# Patient Record
Sex: MALE | Race: BLACK OR AFRICAN AMERICAN
[De-identification: names, ages, dates, MRNs, and addresses within clinical notes are randomized per-mention and may not be internally consistent; named-entity substitution may affect disease eponyms.]

---

## 2019-11-12 ENCOUNTER — HOSPITAL ENCOUNTER (INPATIENT)
Dept: HOSPITAL 15 - ER | Age: 64
LOS: 8 days | Discharge: HOME HEALTH | DRG: 133 | End: 2019-11-20
Attending: INTERNAL MEDICINE | Admitting: NURSE PRACTITIONER
Payer: MEDICAID

## 2019-11-12 VITALS — WEIGHT: 211.64 LBS | HEIGHT: 68 IN | BODY MASS INDEX: 32.08 KG/M2

## 2019-11-12 DIAGNOSIS — E66.9: ICD-10-CM

## 2019-11-12 DIAGNOSIS — E87.5: ICD-10-CM

## 2019-11-12 DIAGNOSIS — I50.43: ICD-10-CM

## 2019-11-12 DIAGNOSIS — I13.0: ICD-10-CM

## 2019-11-12 DIAGNOSIS — D63.8: ICD-10-CM

## 2019-11-12 DIAGNOSIS — Z99.81: ICD-10-CM

## 2019-11-12 DIAGNOSIS — K72.90: ICD-10-CM

## 2019-11-12 DIAGNOSIS — Z71.6: ICD-10-CM

## 2019-11-12 DIAGNOSIS — K21.9: ICD-10-CM

## 2019-11-12 DIAGNOSIS — E11.22: ICD-10-CM

## 2019-11-12 DIAGNOSIS — F12.90: ICD-10-CM

## 2019-11-12 DIAGNOSIS — Z79.01: ICD-10-CM

## 2019-11-12 DIAGNOSIS — J44.9: ICD-10-CM

## 2019-11-12 DIAGNOSIS — N18.4: ICD-10-CM

## 2019-11-12 DIAGNOSIS — N40.0: ICD-10-CM

## 2019-11-12 DIAGNOSIS — I08.1: ICD-10-CM

## 2019-11-12 DIAGNOSIS — J96.00: Primary | ICD-10-CM

## 2019-11-12 DIAGNOSIS — E78.5: ICD-10-CM

## 2019-11-12 DIAGNOSIS — E11.65: ICD-10-CM

## 2019-11-12 DIAGNOSIS — Z86.718: ICD-10-CM

## 2019-11-12 DIAGNOSIS — D68.9: ICD-10-CM

## 2019-11-12 DIAGNOSIS — I42.9: ICD-10-CM

## 2019-11-12 DIAGNOSIS — Z99.2: ICD-10-CM

## 2019-11-12 DIAGNOSIS — Z83.3: ICD-10-CM

## 2019-11-12 DIAGNOSIS — F17.210: ICD-10-CM

## 2019-11-12 DIAGNOSIS — Z79.899: ICD-10-CM

## 2019-11-12 DIAGNOSIS — Z82.49: ICD-10-CM

## 2019-11-12 DIAGNOSIS — N17.9: ICD-10-CM

## 2019-11-12 PROCEDURE — 84100 ASSAY OF PHOSPHORUS: CPT

## 2019-11-12 PROCEDURE — 93005 ELECTROCARDIOGRAM TRACING: CPT

## 2019-11-12 PROCEDURE — 84484 ASSAY OF TROPONIN QUANT: CPT

## 2019-11-12 PROCEDURE — 83036 HEMOGLOBIN GLYCOSYLATED A1C: CPT

## 2019-11-12 PROCEDURE — 93306 TTE W/DOPPLER COMPLETE: CPT

## 2019-11-12 PROCEDURE — 87340 HEPATITIS B SURFACE AG IA: CPT

## 2019-11-12 PROCEDURE — 36600 WITHDRAWAL OF ARTERIAL BLOOD: CPT

## 2019-11-12 PROCEDURE — 85610 PROTHROMBIN TIME: CPT

## 2019-11-12 PROCEDURE — 83880 ASSAY OF NATRIURETIC PEPTIDE: CPT

## 2019-11-12 PROCEDURE — 86803 HEPATITIS C AB TEST: CPT

## 2019-11-12 PROCEDURE — 81001 URINALYSIS AUTO W/SCOPE: CPT

## 2019-11-12 PROCEDURE — 83735 ASSAY OF MAGNESIUM: CPT

## 2019-11-12 PROCEDURE — 82805 BLOOD GASES W/O2 SATURATION: CPT

## 2019-11-12 PROCEDURE — 82270 OCCULT BLOOD FECES: CPT

## 2019-11-12 PROCEDURE — 96361 HYDRATE IV INFUSION ADD-ON: CPT

## 2019-11-12 PROCEDURE — 96365 THER/PROPH/DIAG IV INF INIT: CPT

## 2019-11-12 PROCEDURE — 82728 ASSAY OF FERRITIN: CPT

## 2019-11-12 PROCEDURE — 86704 HEP B CORE ANTIBODY TOTAL: CPT

## 2019-11-12 PROCEDURE — 80048 BASIC METABOLIC PNL TOTAL CA: CPT

## 2019-11-12 PROCEDURE — 71045 X-RAY EXAM CHEST 1 VIEW: CPT

## 2019-11-12 PROCEDURE — 84132 ASSAY OF SERUM POTASSIUM: CPT

## 2019-11-12 PROCEDURE — 86706 HEP B SURFACE ANTIBODY: CPT

## 2019-11-12 PROCEDURE — 78582 LUNG VENTILAT&PERFUS IMAGING: CPT

## 2019-11-12 PROCEDURE — 80076 HEPATIC FUNCTION PANEL: CPT

## 2019-11-12 PROCEDURE — 85730 THROMBOPLASTIN TIME PARTIAL: CPT

## 2019-11-12 PROCEDURE — 80053 COMPREHEN METABOLIC PANEL: CPT

## 2019-11-12 PROCEDURE — 83550 IRON BINDING TEST: CPT

## 2019-11-12 PROCEDURE — 87081 CULTURE SCREEN ONLY: CPT

## 2019-11-12 PROCEDURE — 86708 HEPATITIS A ANTIBODY: CPT

## 2019-11-12 PROCEDURE — 83540 ASSAY OF IRON: CPT

## 2019-11-12 PROCEDURE — 80307 DRUG TEST PRSMV CHEM ANLYZR: CPT

## 2019-11-12 PROCEDURE — 80061 LIPID PANEL: CPT

## 2019-11-12 PROCEDURE — 36415 COLL VENOUS BLD VENIPUNCTURE: CPT

## 2019-11-12 PROCEDURE — 96375 TX/PRO/DX INJ NEW DRUG ADDON: CPT

## 2019-11-12 PROCEDURE — 90935 HEMODIALYSIS ONE EVALUATION: CPT

## 2019-11-12 PROCEDURE — 94640 AIRWAY INHALATION TREATMENT: CPT

## 2019-11-12 PROCEDURE — 85025 COMPLETE CBC W/AUTO DIFF WBC: CPT

## 2019-11-12 PROCEDURE — 80162 ASSAY OF DIGOXIN TOTAL: CPT

## 2019-11-12 PROCEDURE — 93970 EXTREMITY STUDY: CPT

## 2019-11-12 PROCEDURE — 82962 GLUCOSE BLOOD TEST: CPT

## 2019-11-12 PROCEDURE — 85379 FIBRIN DEGRADATION QUANT: CPT

## 2019-11-13 VITALS — SYSTOLIC BLOOD PRESSURE: 107 MMHG | DIASTOLIC BLOOD PRESSURE: 69 MMHG

## 2019-11-13 VITALS — SYSTOLIC BLOOD PRESSURE: 114 MMHG | DIASTOLIC BLOOD PRESSURE: 67 MMHG

## 2019-11-13 VITALS — SYSTOLIC BLOOD PRESSURE: 115 MMHG | DIASTOLIC BLOOD PRESSURE: 81 MMHG

## 2019-11-13 VITALS — DIASTOLIC BLOOD PRESSURE: 77 MMHG | SYSTOLIC BLOOD PRESSURE: 143 MMHG

## 2019-11-13 VITALS — DIASTOLIC BLOOD PRESSURE: 75 MMHG | SYSTOLIC BLOOD PRESSURE: 111 MMHG

## 2019-11-13 VITALS — SYSTOLIC BLOOD PRESSURE: 86 MMHG | DIASTOLIC BLOOD PRESSURE: 60 MMHG

## 2019-11-13 LAB
ALBUMIN SERPL-MCNC: 3 G/DL (ref 3.4–5)
ALCOHOL, URINE: < 3 MG/DL (ref 0–5)
ALP SERPL-CCNC: 261 U/L (ref 45–117)
ALT SERPL-CCNC: 136 U/L (ref 16–61)
AMPHETAMINES UR QL SCN: NEGATIVE
ANION GAP SERPL CALCULATED.3IONS-SCNC: 6 MMOL/L (ref 5–15)
ANION GAP SERPL CALCULATED.3IONS-SCNC: 8 MMOL/L (ref 5–15)
APTT PPP: 28.7 SEC (ref 23.64–32.05)
BARBITURATES UR QL SCN: NEGATIVE
BENZODIAZ UR QL SCN: NEGATIVE
BILIRUB SERPL-MCNC: 0.3 MG/DL (ref 0.2–1)
BUN SERPL-MCNC: 102 MG/DL (ref 7–18)
BUN SERPL-MCNC: 89 MG/DL (ref 7–18)
BUN/CREAT SERPL: 25.3
BUN/CREAT SERPL: 28.9
BZE UR QL SCN: NEGATIVE
CALCIUM SERPL-MCNC: 8.2 MG/DL (ref 8.5–10.1)
CALCIUM SERPL-MCNC: 8.4 MG/DL (ref 8.5–10.1)
CANNABINOIDS UR QL SCN: NEGATIVE
CHLORIDE SERPL-SCNC: 106 MMOL/L (ref 98–107)
CHLORIDE SERPL-SCNC: 108 MMOL/L (ref 98–107)
CO2 SERPL-SCNC: 22 MMOL/L (ref 21–32)
CO2 SERPL-SCNC: 24 MMOL/L (ref 21–32)
GLUCOSE SERPL-MCNC: 148 MG/DL (ref 74–106)
GLUCOSE SERPL-MCNC: 86 MG/DL (ref 74–106)
HCT VFR BLD AUTO: 36.6 % (ref 41–53)
HGB BLD-MCNC: 11.6 G/DL (ref 13.5–17.5)
INR PPP: 1.34 (ref 0.9–1.15)
MCH RBC QN AUTO: 28.8 PG (ref 28–32)
MCV RBC AUTO: 91.2 FL (ref 80–100)
NRBC BLD QL AUTO: 0.1 %
OPIATES UR QL SCN: NEGATIVE
PCP UR QL SCN: NEGATIVE
POTASSIUM SERPL-SCNC: 5.2 MMOL/L (ref 3.5–5.1)
POTASSIUM SERPL-SCNC: 5.8 MMOL/L (ref 3.5–5.1)
PROT SERPL-MCNC: 7 G/DL (ref 6.4–8.2)
SODIUM SERPL-SCNC: 136 MMOL/L (ref 136–145)
SODIUM SERPL-SCNC: 138 MMOL/L (ref 136–145)

## 2019-11-13 RX ADMIN — PANTOPRAZOLE SODIUM SCH MG: 40 TABLET, DELAYED RELEASE ORAL at 09:18

## 2019-11-13 RX ADMIN — Medication SCH STRIP: at 06:19

## 2019-11-13 RX ADMIN — APIXABAN SCH MG: 5 TABLET, FILM COATED ORAL at 20:46

## 2019-11-13 RX ADMIN — CARVEDILOL SCH MG: 3.12 TABLET, FILM COATED ORAL at 09:21

## 2019-11-13 RX ADMIN — SODIUM ZIRCONIUM CYCLOSILICATE SCH GM: 5 POWDER, FOR SUSPENSION ORAL at 23:16

## 2019-11-13 RX ADMIN — HUMAN INSULIN SCH UNITS: 100 INJECTION, SOLUTION SUBCUTANEOUS at 23:37

## 2019-11-13 RX ADMIN — SODIUM ZIRCONIUM CYCLOSILICATE SCH GM: 5 POWDER, FOR SUSPENSION ORAL at 12:58

## 2019-11-13 RX ADMIN — Medication SCH STRIP: at 12:00

## 2019-11-13 RX ADMIN — Medication SCH STRIP: at 23:37

## 2019-11-13 RX ADMIN — HUMAN INSULIN SCH UNITS: 100 INJECTION, SOLUTION SUBCUTANEOUS at 06:19

## 2019-11-13 RX ADMIN — ATORVASTATIN CALCIUM SCH MG: 20 TABLET, FILM COATED ORAL at 20:47

## 2019-11-13 RX ADMIN — DEXTROSE SCH MLS/HR: 50 INJECTION, SOLUTION INTRAVENOUS at 20:12

## 2019-11-13 RX ADMIN — Medication SCH STRIP: at 17:13

## 2019-11-13 RX ADMIN — CARVEDILOL SCH MG: 3.12 TABLET, FILM COATED ORAL at 20:46

## 2019-11-13 RX ADMIN — HUMAN INSULIN SCH UNITS: 100 INJECTION, SOLUTION SUBCUTANEOUS at 12:00

## 2019-11-13 RX ADMIN — APIXABAN SCH MG: 5 TABLET, FILM COATED ORAL at 09:18

## 2019-11-13 RX ADMIN — HUMAN INSULIN SCH UNITS: 100 INJECTION, SOLUTION SUBCUTANEOUS at 17:13

## 2019-11-13 NOTE — NUR
Dr. Garcia / nephro at bed side to see pt, doctor informed of the potassium level of 6.1, 
orders received, doctor discussed plan of care with pt.

## 2019-11-13 NOTE — NUR
Telemetry admit from COREEN BELCHER admitted to Telemetry unit. Patient oriented to CARLY MOORE, primary RN, 
unit, room 284, bed B, and unit policies regarding patient care and visiting hours. Patient 
now on continuous telemetry monitoring, tele box #66 and telemetry reading on arrival to 
unit is SR. Patient placed on bedside oxygen, weighed by bedscale and encouraged to call if 
they need something. Call light explained and placed within reach. Adan catheter is in 
place. All questions and concerns addressed, patient verbalized understanding.

## 2019-11-13 NOTE — NUR
IV insertion

IV access obtained, via clean sterile technique by inserting 22 gauge catheter at LT UPPER 
CHEST after 2 attempts. IV secured properly. No trauma to site. Patient tolerated well.

## 2019-11-13 NOTE — NUR
Opening Shift Note

Assumed care of patient, awake and alert.  No S/S of distress/SOB or pain. Saline bolus 
finished. Patient started on drip medication. Instructed on POC and to call for assist PRN, 
will continue to monitor for changes Q1hr and PRN.

## 2019-11-13 NOTE — NUR
Respiratory note:

PT ASSESSED FOR PRN MED NEB TX. PT IS CURRENTLY ON 2 L/M NC: HR 84, RR 18, SPO2 100%. PT 
SHOWS NO S/S OF SOB OR RESPIRATORY DISTRESS. MED NEB TX NOT INDICATED AT THIS TIME. PT AWARE 
TO CALL RESPIRATORY IF SOB OCCURS. WILL CONTINUE TO MONITOR.

## 2019-11-13 NOTE — NUR
HOSPITALIST PAGED



BM OF PATIENT INSPECTED. BLOOD FOUND IN STOOL. PATIENT STATES THIS IS NEW. WILL INFORM 
HOSPITALIST. WILL AWAIT CALL BACK OR ORDERS.

## 2019-11-13 NOTE — NUR
SALINE BOLUS RUNNING AT THIS TIME, WILL ENDORSE FOR NIGHT SHIFT TO START THE LASIX DRIP 
AFTER THE BOLUS AS PER DR. GARDINER'S ORDERS.

## 2019-11-13 NOTE — NUR
Opening Shift Note



Assumed care of patient, awake and alert.  No S/S of distress/SOB or pain. Bed is in lowest 
position with 2x side rails up for safety. Call light is within reach. Instructed on POC and 
to call for assist PRN, will continue to monitor for changes Q1hr and PRN.

## 2019-11-14 VITALS — SYSTOLIC BLOOD PRESSURE: 141 MMHG | DIASTOLIC BLOOD PRESSURE: 75 MMHG

## 2019-11-14 VITALS — DIASTOLIC BLOOD PRESSURE: 76 MMHG | SYSTOLIC BLOOD PRESSURE: 121 MMHG

## 2019-11-14 VITALS — DIASTOLIC BLOOD PRESSURE: 67 MMHG | SYSTOLIC BLOOD PRESSURE: 107 MMHG

## 2019-11-14 VITALS — DIASTOLIC BLOOD PRESSURE: 72 MMHG | SYSTOLIC BLOOD PRESSURE: 106 MMHG

## 2019-11-14 VITALS — DIASTOLIC BLOOD PRESSURE: 73 MMHG | SYSTOLIC BLOOD PRESSURE: 122 MMHG

## 2019-11-14 LAB
ALBUMIN SERPL-MCNC: 3 G/DL (ref 3.4–5)
ALP SERPL-CCNC: 244 U/L (ref 45–117)
ALT SERPL-CCNC: 147 U/L (ref 16–61)
ANION GAP SERPL CALCULATED.3IONS-SCNC: 10 MMOL/L (ref 5–15)
BILIRUB SERPL-MCNC: 0.4 MG/DL (ref 0.2–1)
BUN SERPL-MCNC: 105 MG/DL (ref 7–18)
BUN/CREAT SERPL: 32.9
CALCIUM SERPL-MCNC: 8.4 MG/DL (ref 8.5–10.1)
CHLORIDE SERPL-SCNC: 106 MMOL/L (ref 98–107)
CHOLEST SERPL-MCNC: 125 MG/DL (ref ?–200)
CO2 SERPL-SCNC: 20 MMOL/L (ref 21–32)
GLUCOSE SERPL-MCNC: 116 MG/DL (ref 74–106)
HCT VFR BLD AUTO: 36.8 % (ref 41–53)
HDLC SERPL-MCNC: 55 MG/DL (ref 40–59)
HGB BLD-MCNC: 11.5 G/DL (ref 13.5–17.5)
MAGNESIUM SERPL-MCNC: 2.8 MG/DL (ref 1.6–2.6)
MCH RBC QN AUTO: 29.4 PG (ref 28–32)
MCV RBC AUTO: 94 FL (ref 80–100)
NRBC BLD QL AUTO: 0.2 %
POTASSIUM SERPL-SCNC: 4.6 MMOL/L (ref 3.5–5.1)
PROT SERPL-MCNC: 6.7 G/DL (ref 6.4–8.2)
SODIUM SERPL-SCNC: 136 MMOL/L (ref 136–145)
TRIGL SERPL-MCNC: 28 MG/DL (ref ?–150)

## 2019-11-14 RX ADMIN — CARVEDILOL SCH MG: 3.12 TABLET, FILM COATED ORAL at 09:33

## 2019-11-14 RX ADMIN — APIXABAN SCH MG: 5 TABLET, FILM COATED ORAL at 09:33

## 2019-11-14 RX ADMIN — SODIUM ZIRCONIUM CYCLOSILICATE SCH GM: 5 POWDER, FOR SUSPENSION ORAL at 15:33

## 2019-11-14 RX ADMIN — Medication SCH STRIP: at 17:51

## 2019-11-14 RX ADMIN — CARVEDILOL SCH MG: 3.12 TABLET, FILM COATED ORAL at 22:12

## 2019-11-14 RX ADMIN — HUMAN INSULIN SCH UNITS: 100 INJECTION, SOLUTION SUBCUTANEOUS at 11:28

## 2019-11-14 RX ADMIN — Medication SCH STRIP: at 11:27

## 2019-11-14 RX ADMIN — ATORVASTATIN CALCIUM SCH MG: 20 TABLET, FILM COATED ORAL at 22:11

## 2019-11-14 RX ADMIN — SODIUM ZIRCONIUM CYCLOSILICATE SCH GM: 5 POWDER, FOR SUSPENSION ORAL at 22:12

## 2019-11-14 RX ADMIN — IPRATROPIUM BROMIDE PRN MG: 0.5 SOLUTION RESPIRATORY (INHALATION) at 20:07

## 2019-11-14 RX ADMIN — ACETAMINOPHEN PRN MG: 325 TABLET ORAL at 09:39

## 2019-11-14 RX ADMIN — HUMAN INSULIN SCH UNITS: 100 INJECTION, SOLUTION SUBCUTANEOUS at 05:34

## 2019-11-14 RX ADMIN — APIXABAN SCH MG: 5 TABLET, FILM COATED ORAL at 22:11

## 2019-11-14 RX ADMIN — DEXTROSE SCH MLS/HR: 50 INJECTION, SOLUTION INTRAVENOUS at 14:01

## 2019-11-14 RX ADMIN — Medication SCH STRIP: at 05:34

## 2019-11-14 RX ADMIN — ACETAMINOPHEN PRN MG: 325 TABLET ORAL at 23:05

## 2019-11-14 RX ADMIN — PANTOPRAZOLE SODIUM SCH MG: 40 TABLET, DELAYED RELEASE ORAL at 09:33

## 2019-11-14 RX ADMIN — HUMAN INSULIN SCH UNITS: 100 INJECTION, SOLUTION SUBCUTANEOUS at 17:51

## 2019-11-14 RX ADMIN — SODIUM ZIRCONIUM CYCLOSILICATE SCH GM: 5 POWDER, FOR SUSPENSION ORAL at 05:34

## 2019-11-14 NOTE — NUR
D/C Planning 



Per SS Consult for Chair Time Dialysis. Contact [facility] Ph:( [phone]) Fax:( [phone]) faxed medical records. Will be following up tomorrow.

## 2019-11-14 NOTE — NUR
Opening Shift Note

Assumed care of patient, awake and alert x4. Patient denies pain at this time. Patient is on 
3L NC, no signs/symptoms of distress noted at this time. Instructed on plan of care and to 
call for assistance as needed, patient verbalized understanding. Bed is locked in lowest 
position, side rails x 2 are up, call light is within reach, and bed alarm is on.

## 2019-11-14 NOTE — NUR
Opening Shift Note

RECEIVED REPORT FROM NOC RN. Assumed care of patient, awake and alert. PATIENT ON OXYGEN AT 
2 LPM VIA NASAL CANNULA WITH no S/S of distress/SOB or pain. BED IN LOWEST, LOCKED POSITION 
WITH SIDERAILS UP x2 AND CALL LIGHT WITHIN REACH. Instructed on POC and to call for assist 
PRN, will continue to monitor for changes Q1hr and PRN.

## 2019-11-14 NOTE — NUR
RT NOTE: PRN BREATHING TX. NOT INDICATED AT THIS TIME. NO S/S OF SOB. PT. HR 84, RR 14, POX 
96% R/A. PT. AWARE TO NOTIFY RN IF BREATHING TX. IS NEEDED.

## 2019-11-15 VITALS — DIASTOLIC BLOOD PRESSURE: 84 MMHG | SYSTOLIC BLOOD PRESSURE: 139 MMHG

## 2019-11-15 VITALS — DIASTOLIC BLOOD PRESSURE: 80 MMHG | SYSTOLIC BLOOD PRESSURE: 112 MMHG

## 2019-11-15 VITALS — DIASTOLIC BLOOD PRESSURE: 80 MMHG | SYSTOLIC BLOOD PRESSURE: 131 MMHG

## 2019-11-15 VITALS — SYSTOLIC BLOOD PRESSURE: 127 MMHG | DIASTOLIC BLOOD PRESSURE: 60 MMHG

## 2019-11-15 VITALS — DIASTOLIC BLOOD PRESSURE: 84 MMHG | SYSTOLIC BLOOD PRESSURE: 137 MMHG

## 2019-11-15 VITALS — DIASTOLIC BLOOD PRESSURE: 85 MMHG | SYSTOLIC BLOOD PRESSURE: 140 MMHG

## 2019-11-15 LAB
ALBUMIN SERPL-MCNC: 3.1 G/DL (ref 3.4–5)
ALP SERPL-CCNC: 252 U/L (ref 45–117)
ALT SERPL-CCNC: 134 U/L (ref 16–61)
ANION GAP SERPL CALCULATED.3IONS-SCNC: 8 MMOL/L (ref 5–15)
BILIRUB SERPL-MCNC: 0.4 MG/DL (ref 0.2–1)
BUN SERPL-MCNC: 90 MG/DL (ref 7–18)
BUN/CREAT SERPL: 30.6
CALCIUM SERPL-MCNC: 8.5 MG/DL (ref 8.5–10.1)
CHLORIDE SERPL-SCNC: 101 MMOL/L (ref 98–107)
CO2 SERPL-SCNC: 26 MMOL/L (ref 21–32)
GLUCOSE SERPL-MCNC: 128 MG/DL (ref 74–106)
POTASSIUM SERPL-SCNC: 4.3 MMOL/L (ref 3.5–5.1)
PROT SERPL-MCNC: 7.2 G/DL (ref 6.4–8.2)
SODIUM SERPL-SCNC: 135 MMOL/L (ref 136–145)

## 2019-11-15 PROCEDURE — 5A1D70Z PERFORMANCE OF URINARY FILTRATION, INTERMITTENT, LESS THAN 6 HOURS PER DAY: ICD-10-PCS | Performed by: INTERNAL MEDICINE

## 2019-11-15 RX ADMIN — HUMAN INSULIN SCH UNITS: 100 INJECTION, SOLUTION SUBCUTANEOUS at 06:00

## 2019-11-15 RX ADMIN — Medication SCH STRIP: at 11:51

## 2019-11-15 RX ADMIN — DEXTROSE SCH MLS/HR: 50 INJECTION, SOLUTION INTRAVENOUS at 18:53

## 2019-11-15 RX ADMIN — APIXABAN SCH MG: 5 TABLET, FILM COATED ORAL at 22:56

## 2019-11-15 RX ADMIN — HUMAN INSULIN SCH UNITS: 100 INJECTION, SOLUTION SUBCUTANEOUS at 18:54

## 2019-11-15 RX ADMIN — Medication SCH STRIP: at 01:31

## 2019-11-15 RX ADMIN — PANTOPRAZOLE SODIUM SCH MG: 40 TABLET, DELAYED RELEASE ORAL at 14:21

## 2019-11-15 RX ADMIN — TEMAZEPAM PRN MG: 15 CAPSULE ORAL at 01:41

## 2019-11-15 RX ADMIN — CARVEDILOL SCH MG: 3.12 TABLET, FILM COATED ORAL at 22:56

## 2019-11-15 RX ADMIN — APIXABAN SCH MG: 5 TABLET, FILM COATED ORAL at 14:20

## 2019-11-15 RX ADMIN — Medication SCH STRIP: at 18:54

## 2019-11-15 RX ADMIN — HUMAN INSULIN SCH UNITS: 100 INJECTION, SOLUTION SUBCUTANEOUS at 01:31

## 2019-11-15 RX ADMIN — SODIUM ZIRCONIUM CYCLOSILICATE SCH GM: 5 POWDER, FOR SUSPENSION ORAL at 05:59

## 2019-11-15 RX ADMIN — HUMAN INSULIN SCH UNITS: 100 INJECTION, SOLUTION SUBCUTANEOUS at 11:50

## 2019-11-15 RX ADMIN — GABAPENTIN SCH MG: 300 CAPSULE ORAL at 22:56

## 2019-11-15 RX ADMIN — CARVEDILOL SCH MG: 3.12 TABLET, FILM COATED ORAL at 14:21

## 2019-11-15 RX ADMIN — ATORVASTATIN CALCIUM SCH MG: 20 TABLET, FILM COATED ORAL at 22:56

## 2019-11-15 RX ADMIN — Medication SCH STRIP: at 06:00

## 2019-11-15 NOTE — NUR
Patient disconnected IV line from IV site and found on room air. Patient stated that he did 
not pull out the IV and he is not sure how it came apart.

-------------------------------------------------------------------------------

Addendum: 11/15/19 at 2342 by HELEN JOHNSON RN RN

-------------------------------------------------------------------------------

Full linen and gown changed

## 2019-11-15 NOTE — NUR
Opening Shift Note

Assumed care of patient. Patient awake and alert. No S/S of distress/SOB or pain. Instructed 
on POC and to call for assist PRN, will continue to monitor for changes. Bed locked in 
lowest position and bed rails up x2. Call light within reach.

## 2019-11-15 NOTE — NUR
MD Discharge Appt

Doctor Sanjeev at bedside. Ordered dialysis for today and tomorrow. Patient needs to have a 
follow up doctors appointment with Dr. Pace 1 week after discharge.

## 2019-11-15 NOTE — NUR
CLOSING SHIFT NOTE

Patient is sitting on the side of the bed with even and unlabored respirations noted. 
Patient is on 3L NC, no signs/symptoms of distress noted at this  time. Bed is locked in 
lowest position, side rails x 3 are up, call light is within reach, and bed alarm is on. 
Endorsed patient care to Leona TAYLOR.

## 2019-11-15 NOTE — NUR
D/C Planning 



Followed up call to [facility] dialysis spoke to Cielo. Per Cielo from [facility] Pt 
chair time will be Tuesday, Thursday, Saturdays at 14:45 and needs to arrived 15 minutes 
early to [id number] [place] [address]. 

-------------------------------------------------------------------------------

Addendum: 11/15/19 at 1448 by THU ALFARO

-------------------------------------------------------------------------------

Amended: Links added.

## 2019-11-15 NOTE — NUR
Opening Shift Note

Received report on the patient. Awake lying in bed. Patient shows no signs of distress at 
this time. Discussed plan of care with the patient and family. Bed is in the lowest 
position, side rails up x2, and call light is within reach.

## 2019-11-15 NOTE — NUR
Critical Lab

notified by Sofia of a critical lab value. BUN is 90. Trending down. MD already aware. 
Patient receiving dialysis today.

## 2019-11-16 VITALS — DIASTOLIC BLOOD PRESSURE: 71 MMHG | SYSTOLIC BLOOD PRESSURE: 128 MMHG

## 2019-11-16 VITALS — SYSTOLIC BLOOD PRESSURE: 113 MMHG | DIASTOLIC BLOOD PRESSURE: 78 MMHG

## 2019-11-16 LAB
ALBUMIN SERPL-MCNC: 3 G/DL (ref 3.4–5)
ALP SERPL-CCNC: 209 U/L (ref 45–117)
ALT SERPL-CCNC: 98 U/L (ref 16–61)
ANION GAP SERPL CALCULATED.3IONS-SCNC: 6 MMOL/L (ref 5–15)
BILIRUB SERPL-MCNC: 0.6 MG/DL (ref 0.2–1)
BUN SERPL-MCNC: 67 MG/DL (ref 7–18)
BUN/CREAT SERPL: 29.6
CALCIUM SERPL-MCNC: 8.1 MG/DL (ref 8.5–10.1)
CHLORIDE SERPL-SCNC: 103 MMOL/L (ref 98–107)
CO2 SERPL-SCNC: 27 MMOL/L (ref 21–32)
GLUCOSE SERPL-MCNC: 93 MG/DL (ref 74–106)
POTASSIUM SERPL-SCNC: 3.9 MMOL/L (ref 3.5–5.1)
PROT SERPL-MCNC: 6.7 G/DL (ref 6.4–8.2)
SODIUM SERPL-SCNC: 136 MMOL/L (ref 136–145)

## 2019-11-16 PROCEDURE — 5A1D70Z PERFORMANCE OF URINARY FILTRATION, INTERMITTENT, LESS THAN 6 HOURS PER DAY: ICD-10-PCS | Performed by: INTERNAL MEDICINE

## 2019-11-16 RX ADMIN — HUMAN INSULIN SCH UNITS: 100 INJECTION, SOLUTION SUBCUTANEOUS at 06:00

## 2019-11-16 RX ADMIN — ATORVASTATIN CALCIUM SCH MG: 20 TABLET, FILM COATED ORAL at 21:02

## 2019-11-16 RX ADMIN — GABAPENTIN SCH MG: 300 CAPSULE ORAL at 06:13

## 2019-11-16 RX ADMIN — HUMAN INSULIN SCH UNITS: 100 INJECTION, SOLUTION SUBCUTANEOUS at 15:42

## 2019-11-16 RX ADMIN — APIXABAN SCH MG: 5 TABLET, FILM COATED ORAL at 21:01

## 2019-11-16 RX ADMIN — TEMAZEPAM PRN MG: 15 CAPSULE ORAL at 21:04

## 2019-11-16 RX ADMIN — GABAPENTIN SCH MG: 300 CAPSULE ORAL at 21:01

## 2019-11-16 RX ADMIN — Medication SCH STRIP: at 06:13

## 2019-11-16 RX ADMIN — DEXTROSE SCH MLS/HR: 50 INJECTION, SOLUTION INTRAVENOUS at 14:32

## 2019-11-16 RX ADMIN — PANTOPRAZOLE SODIUM SCH MG: 40 TABLET, DELAYED RELEASE ORAL at 10:00

## 2019-11-16 RX ADMIN — HUMAN INSULIN SCH UNITS: 100 INJECTION, SOLUTION SUBCUTANEOUS at 02:23

## 2019-11-16 RX ADMIN — CARVEDILOL SCH MG: 3.12 TABLET, FILM COATED ORAL at 21:02

## 2019-11-16 RX ADMIN — Medication SCH STRIP: at 17:22

## 2019-11-16 RX ADMIN — APIXABAN SCH MG: 5 TABLET, FILM COATED ORAL at 10:00

## 2019-11-16 RX ADMIN — CARVEDILOL SCH MG: 3.12 TABLET, FILM COATED ORAL at 10:00

## 2019-11-16 RX ADMIN — HUMAN INSULIN SCH UNITS: 100 INJECTION, SOLUTION SUBCUTANEOUS at 18:00

## 2019-11-16 RX ADMIN — Medication SCH STRIP: at 02:23

## 2019-11-16 RX ADMIN — Medication SCH STRIP: at 12:53

## 2019-11-16 RX ADMIN — GABAPENTIN SCH MG: 300 CAPSULE ORAL at 15:44

## 2019-11-16 NOTE — NUR
Patient refused blood pressure reassessment:

Patient refusing blood pressure reassessment at this time.  Keeps pulling arm away wanting 
to sleep and does not want to be touched.  Attempted to educate patient but patient refused

## 2019-11-16 NOTE — NUR
NUTRITION ASSESSMENT NOTES



Please refer to link notes of nutrition screen form filed under the intervention section of 
the plan of care for further details.



Est. Needs: 1700 kcal to 2250 kcal (15-20 kcal/kgBW), 56 gms to 70 gms pro (0.8-1.0 
gms/kgIBW: 70 kg). Will continue to monitor pertinent labs and reassess nutrient need prn



Thank you.




-------------------------------------------------------------------------------

Addendum: 11/16/19 at 1251 by Sirisha Renteria RD

-------------------------------------------------------------------------------

Amended: Links added.

## 2019-11-16 NOTE — NUR
Opening Shift Note

Assumed care of patient, resting in bed with eyes closed.  No S/S of distress/SOB or pain. 
Bed set in lowest locked position with call light within reach and side rails up x 2 for 
safety, will continue to monitor for changes Q1hr and PRN.

## 2019-11-16 NOTE — NUR
PATIENT AGITATED:

MADE AWARE BY SITTER THAT PATIENT HAS BECOME INCREASINGLY AGITATED AND ATTEMPTED TO PULL OUT 
IV AND RENDON CATHETER AND IS THREATENING TO LEAVE IMMEDIATELY.  WENT TO SEE PATIENT AND 
PATIENT IS SAYING " Take out tis IV right now, Im out of here and im leaving Im not staying 
here."  ATTEMPTED TO REORIENT AND EDUCATE PATIENT ABOUT WHERE IS HE AT AND WHY HE IS BEING 
TREATED.  PATIENT IS DEMANDING TO SEE HIS WIFE WALKING THE HALLS LOOKING FOR AN EXIT.  
CALLED PATIENTS WIFE AND EXPLAINED THE SITUATION TO WIFE.  WIFE REQUESTING FOR PATIENT TO 
STAY,  WIFE SPOKE TO  BUT PATIENT CONTINUES TO BE AGITATED AND THREATENING TO LEAVE. 
WIFE SPOKE TO CHARGE NURSE OVER THE PHONE AND IS TO SEND DAUGHTER TO  THE PATIENT.  
EXPLAINED TO PATIENT THAT DAUGHTER SHOULD BE COMING TO PICK HIM UP AND TO WAIT IN THE ROOM 
IN THE MEAN TIME.  PATIENT VERBALIZED UNDERSTANDING AND WAS ESCORTED TO THE ROOM.

## 2019-11-16 NOTE — NUR
Patient complied to being reconnected back to IV lasix drip.  Family at the bedside.  
Patient resting in bed with breaths even and unlabored. Patient still wanting to go 
home,this rn reoriented and educated the need of staying in hospital for treatment.  Patient 
verbalized understanding but still grumpy.

## 2019-11-16 NOTE — NUR
Patient disconnected from IV lasix drip:

Patient threatening to rip iv and Adan out of not removed.  Attempted to reeducate oatient 
of the need for the medication but refused and stated aggressively "I understand that but if 
you do not remove this right now im going to rip it out myself.  I am going home and you 
cannot stop me."  Disconnected patient from IV drip at this time.  Patient continues to be 
agitated and anxious.  Wanting to leave AMA with no ride.  Will call patients family.

## 2019-11-17 VITALS — DIASTOLIC BLOOD PRESSURE: 67 MMHG | SYSTOLIC BLOOD PRESSURE: 110 MMHG

## 2019-11-17 VITALS — SYSTOLIC BLOOD PRESSURE: 107 MMHG | DIASTOLIC BLOOD PRESSURE: 70 MMHG

## 2019-11-17 VITALS — SYSTOLIC BLOOD PRESSURE: 126 MMHG | DIASTOLIC BLOOD PRESSURE: 71 MMHG

## 2019-11-17 VITALS — DIASTOLIC BLOOD PRESSURE: 68 MMHG | SYSTOLIC BLOOD PRESSURE: 107 MMHG

## 2019-11-17 VITALS — SYSTOLIC BLOOD PRESSURE: 101 MMHG | DIASTOLIC BLOOD PRESSURE: 62 MMHG

## 2019-11-17 VITALS — DIASTOLIC BLOOD PRESSURE: 71 MMHG | SYSTOLIC BLOOD PRESSURE: 128 MMHG

## 2019-11-17 LAB
ANION GAP SERPL CALCULATED.3IONS-SCNC: 6 MMOL/L (ref 5–15)
BUN SERPL-MCNC: 55 MG/DL (ref 7–18)
BUN/CREAT SERPL: 24.7
CALCIUM SERPL-MCNC: 8.1 MG/DL (ref 8.5–10.1)
CHLORIDE SERPL-SCNC: 102 MMOL/L (ref 98–107)
CO2 SERPL-SCNC: 28 MMOL/L (ref 21–32)
GLUCOSE SERPL-MCNC: 188 MG/DL (ref 74–106)
HCT VFR BLD AUTO: 35.7 % (ref 41–53)
HGB BLD-MCNC: 11.4 G/DL (ref 13.5–17.5)
MCH RBC QN AUTO: 28.8 PG (ref 28–32)
MCV RBC AUTO: 90.4 FL (ref 80–100)
NRBC BLD QL AUTO: 0 %
POTASSIUM SERPL-SCNC: 4.7 MMOL/L (ref 3.5–5.1)
SODIUM SERPL-SCNC: 136 MMOL/L (ref 136–145)

## 2019-11-17 RX ADMIN — ACETAMINOPHEN PRN MG: 325 TABLET ORAL at 19:33

## 2019-11-17 RX ADMIN — Medication SCH STRIP: at 00:00

## 2019-11-17 RX ADMIN — HUMAN INSULIN SCH UNITS: 100 INJECTION, SOLUTION SUBCUTANEOUS at 06:44

## 2019-11-17 RX ADMIN — GABAPENTIN SCH MG: 300 CAPSULE ORAL at 22:21

## 2019-11-17 RX ADMIN — PANTOPRAZOLE SODIUM SCH MG: 40 TABLET, DELAYED RELEASE ORAL at 09:06

## 2019-11-17 RX ADMIN — HUMAN INSULIN SCH UNITS: 100 INJECTION, SOLUTION SUBCUTANEOUS at 18:10

## 2019-11-17 RX ADMIN — HUMAN INSULIN SCH UNITS: 100 INJECTION, SOLUTION SUBCUTANEOUS at 23:59

## 2019-11-17 RX ADMIN — Medication SCH STRIP: at 18:10

## 2019-11-17 RX ADMIN — Medication SCH STRIP: at 12:11

## 2019-11-17 RX ADMIN — APIXABAN SCH MG: 5 TABLET, FILM COATED ORAL at 09:06

## 2019-11-17 RX ADMIN — ATORVASTATIN CALCIUM SCH MG: 20 TABLET, FILM COATED ORAL at 22:21

## 2019-11-17 RX ADMIN — Medication SCH STRIP: at 06:39

## 2019-11-17 RX ADMIN — HUMAN INSULIN SCH UNITS: 100 INJECTION, SOLUTION SUBCUTANEOUS at 12:00

## 2019-11-17 RX ADMIN — IPRATROPIUM BROMIDE PRN MG: 0.5 SOLUTION RESPIRATORY (INHALATION) at 09:10

## 2019-11-17 RX ADMIN — TEMAZEPAM PRN MG: 15 CAPSULE ORAL at 22:21

## 2019-11-17 RX ADMIN — CARVEDILOL SCH MG: 3.12 TABLET, FILM COATED ORAL at 22:21

## 2019-11-17 RX ADMIN — GABAPENTIN SCH MG: 300 CAPSULE ORAL at 13:40

## 2019-11-17 RX ADMIN — HUMAN INSULIN SCH UNITS: 100 INJECTION, SOLUTION SUBCUTANEOUS at 00:00

## 2019-11-17 RX ADMIN — GABAPENTIN SCH MG: 300 CAPSULE ORAL at 06:39

## 2019-11-17 RX ADMIN — APIXABAN SCH MG: 5 TABLET, FILM COATED ORAL at 22:21

## 2019-11-17 RX ADMIN — DEXTROSE SCH MLS/HR: 50 INJECTION, SOLUTION INTRAVENOUS at 13:40

## 2019-11-17 RX ADMIN — CARVEDILOL SCH MG: 3.12 TABLET, FILM COATED ORAL at 09:06

## 2019-11-17 NOTE — NUR
PATIENT AGITATED, HOSPITALIST PAGED AND CALLED BACK:

MADE HOSPITALIST JIAN AWARE THAT THE PATIENT IS AGITATED AND CONFUSED AND WANTING TO GO 
HOME.  THREATENING TO PULL OUT IV AND WALKING OUT OF THE HOSPITAL.  PATIENT THREATENS TO GET 
COMBATIVE.  HOSPITALIST ORDERED HALDOL 0.5 MG IM PRN [id number] FOR AGITATION.  HOSPITALIST 
STATED THAT IF THAT DOES NOT WORK AFTER THE FIRST TIME TO DISCONTINUE ORDER AND USE HALDOL 
1MG IM PRN FOR AGITATION [id number]. TO PLACED ORDERS.

## 2019-11-17 NOTE — NUR
Opening Shift Note

Assumed care of patient, awake and alert.  No S/S of distress/SOB or pain.  Instructed on 
POC and to call for assist PRN, will continue to monitor for changes Q1hr and PRN. Bed set 
in lowest locked position with side rails up x 2 for safety, call light within reach.

## 2019-11-17 NOTE — NUR
RT NOTE:

PT ASSESSED BY RT @ THIS TIME. PT FOUND ON .5L NC, SPO2 97%, HR 98, RR 22, CLEAR BS. PT 
TAKEN OFF NASAL CANNULA @ THIS TIME. EXPLAINED TO PT THAT NC IS NOT NEEDED @ THIS TIME. 
ADVISED SITTER @ BEDSIDE TO PAGE FOR RT IF SPO2 IS LOW OFF NC. NO SOB OR DISTRESS NOTED @ 
THIS TIME.

## 2019-11-17 NOTE — NUR
Patient had difficulty swallowing

While eating lunch, patient had an episode of not being able to fully swallow bite taken. 
Family was at bedside and reported patient was in the middle of talking and eating meal. 
Upon entering room patient was SOB, patient was able to extract food and instructed patient 
on deep breathing. Patient O2 sat: 96%, 3L/min NC applied, and bed is in high-[place]'s. 
Observed patient after event, patient is now stable and no signs and symptoms of 
distress/SOB noted. Will continue to monitor.

## 2019-11-17 NOTE — NUR
Paged Dr. Shine

Nephrologist, if MD would like to proceed with patient to receive dialysis tomorrow, because 
left heart cath was cancelled.

## 2019-11-17 NOTE — NUR
RT NOTE:



WENT TO PTS ROOM FOR RA ABG, PT WAS TAKEN OFF OF OXYGEN AT THIS TIME. PT WAS ON PORTABLE 
PULSE OX SPO2 DID NOT GO BELOW 95%, UNABLE TO OBTAIN ABG AT THIS TIME DUE TO SPO2. WILL 
CONTINUE TO MONITOR PT.

## 2019-11-17 NOTE — NUR
PATIENT AGITATED, ATTEMPTING TO PULL OUT IVS, THREATENING TO LEAVE:

PATIENT UNSTABLE ATTEMPTING TO STAND UP AT THE BESIDE WITH UNSTEADY GAIT AND FELL BACK INTO 
BED.  THREATENING TO PULL OUT IV LINE AND WALK OUT OF THE HOSPITAL GOING HOME.  PATIENT 
STATED "Im getting out of here, dont you touch me.  Im walking out.  Where are my keys, im 
driving home."  ATTEMPTED TO REEDUCATE THE PATIENT AND REORIENT THE PATIENT, PATIENT 
CONTINUES TO BE AGITATED AND INSISTS ON LEAVING DESPITE CURRENT CONDITION.  CHARGE NURSE AT 
THE BEDSIDE ATTEMPTING TO CALM PATIENT DOWN.  PATIENT TAKEN OFF LASIX DRIP AFTER PATIENT 
THREATENED TO RIP OUT IV.  PATIENT RESISTIVE TO CARE.  TO CALL HOSPITALIST.

## 2019-11-17 NOTE — NUR
PATIENT THREATENING STAFF:  

PATIENT SITTING AT THE SIDE OF THE BED STATING "Im going to go home.  Im tired of playing 
these games.  You think Im playing but Im not.  All hell is going to break loose if im not 
out of here by a certain time.   You watch.  You cant keep me here. All hell is going to 
break loose"  PATIENT THREATENING THE STAFF DESPITE REORIENTATION.

## 2019-11-17 NOTE — NUR
CALLED HOSPITALIST FOR CLARIFICATION:

CALLED HOSPITALIST TO CLARIFY HALDOL ORDER. HOSPITALIST JIAN CONFIRMED HALDOL 0.5MG IM 
[id number] PRN.  IF THAT DOESNT WORK HALDOL 1MG IM [id number] PRN FOR AGITATION

## 2019-11-17 NOTE — NUR
Left heart catheterization cancelled per MD.

Spoke with Dr. Kwok, procedure due to patient having a recent angiogram at [facility].

## 2019-11-17 NOTE — NUR
Opening Shift Note



Assumed care of patient, awake and alert.  No S/S of distress/SOB. Patient is on 2 liters of 
oxygen via nasal cannula. Patient is alert and oriented x3, patient is not oriented to time. 
Sitter is at bedside. Patient's Efra catheter dressing is dry,clean, and intact. Patient 
Adan is intact, no kinks in tubing, bag below level of bladder, urine is light justice and 
clear.  Instructed on POC and to call for assist PRN, will continue to monitor for changes 
Q1hr and PRN.

## 2019-11-17 NOTE — NUR
PATIENT REFUSED BLOOD SUGAR CHECK:

Patient is resisting and pulling arm away when attempting blood sugar check.  Patient wants 
to sleep and is agitated.  Attempted to educate patient about the need for blood sugar check 
but patient remained dismissive choosing to go to bed.

## 2019-11-18 VITALS — SYSTOLIC BLOOD PRESSURE: 115 MMHG | DIASTOLIC BLOOD PRESSURE: 67 MMHG

## 2019-11-18 VITALS — DIASTOLIC BLOOD PRESSURE: 62 MMHG | SYSTOLIC BLOOD PRESSURE: 98 MMHG

## 2019-11-18 VITALS — DIASTOLIC BLOOD PRESSURE: 75 MMHG | SYSTOLIC BLOOD PRESSURE: 114 MMHG

## 2019-11-18 VITALS — SYSTOLIC BLOOD PRESSURE: 107 MMHG | DIASTOLIC BLOOD PRESSURE: 58 MMHG

## 2019-11-18 LAB
ANION GAP SERPL CALCULATED.3IONS-SCNC: 6 MMOL/L (ref 5–15)
BUN SERPL-MCNC: 70 MG/DL (ref 7–18)
BUN/CREAT SERPL: 24.6
CALCIUM SERPL-MCNC: 8.1 MG/DL (ref 8.5–10.1)
CHLORIDE SERPL-SCNC: 97 MMOL/L (ref 98–107)
CO2 SERPL-SCNC: 29 MMOL/L (ref 21–32)
GLUCOSE SERPL-MCNC: 129 MG/DL (ref 74–106)
HCT VFR BLD AUTO: 34.7 % (ref 41–53)
HGB BLD-MCNC: 11.3 G/DL (ref 13.5–17.5)
IRON SERPL-MCNC: 28 UG/DL (ref 65–175)
MCH RBC QN AUTO: 29.5 PG (ref 28–32)
MCV RBC AUTO: 90.7 FL (ref 80–100)
NRBC BLD QL AUTO: 0 %
POTASSIUM SERPL-SCNC: 4.8 MMOL/L (ref 3.5–5.1)
SODIUM SERPL-SCNC: 132 MMOL/L (ref 136–145)
TIBC SERPL-MCNC: 324 UG/DL (ref 250–450)

## 2019-11-18 RX ADMIN — HUMAN INSULIN SCH UNITS: 100 INJECTION, SOLUTION SUBCUTANEOUS at 23:21

## 2019-11-18 RX ADMIN — APIXABAN SCH MG: 5 TABLET, FILM COATED ORAL at 09:47

## 2019-11-18 RX ADMIN — Medication SCH STRIP: at 23:20

## 2019-11-18 RX ADMIN — CARVEDILOL SCH MG: 3.12 TABLET, FILM COATED ORAL at 21:49

## 2019-11-18 RX ADMIN — HUMAN INSULIN SCH UNITS: 100 INJECTION, SOLUTION SUBCUTANEOUS at 12:00

## 2019-11-18 RX ADMIN — HUMAN INSULIN SCH UNITS: 100 INJECTION, SOLUTION SUBCUTANEOUS at 05:52

## 2019-11-18 RX ADMIN — HYDROCODONE BITARTRATE AND ACETAMINOPHEN PRN TAB: 5; 325 TABLET ORAL at 05:46

## 2019-11-18 RX ADMIN — GABAPENTIN SCH MG: 300 CAPSULE ORAL at 05:47

## 2019-11-18 RX ADMIN — Medication SCH STRIP: at 18:20

## 2019-11-18 RX ADMIN — GABAPENTIN SCH MG: 300 CAPSULE ORAL at 14:33

## 2019-11-18 RX ADMIN — Medication SCH STRIP: at 12:11

## 2019-11-18 RX ADMIN — GABAPENTIN SCH MG: 300 CAPSULE ORAL at 21:48

## 2019-11-18 RX ADMIN — APIXABAN SCH MG: 5 TABLET, FILM COATED ORAL at 21:49

## 2019-11-18 RX ADMIN — TEMAZEPAM PRN MG: 15 CAPSULE ORAL at 21:47

## 2019-11-18 RX ADMIN — HUMAN INSULIN SCH UNITS: 100 INJECTION, SOLUTION SUBCUTANEOUS at 18:21

## 2019-11-18 RX ADMIN — Medication SCH STRIP: at 00:00

## 2019-11-18 RX ADMIN — Medication SCH STRIP: at 05:53

## 2019-11-18 RX ADMIN — ATORVASTATIN CALCIUM SCH MG: 20 TABLET, FILM COATED ORAL at 21:48

## 2019-11-18 RX ADMIN — PANTOPRAZOLE SODIUM SCH MG: 40 TABLET, DELAYED RELEASE ORAL at 09:47

## 2019-11-18 RX ADMIN — CARVEDILOL SCH MG: 3.12 TABLET, FILM COATED ORAL at 09:47

## 2019-11-18 NOTE — NUR
Closing Note

patient resting in bed on room air with even and unlabored respirations, no s/s of distress. 
Dressing to left upper chest CDI. IV intact and patent. Sitter at bedside. Endorsed care to 
day shift RN.

-------------------------------------------------------------------------------

Addendum: 11/18/19 at 0750 by Nat Carver RN RN

-------------------------------------------------------------------------------

dressing to right upper chest, not left.

## 2019-11-18 NOTE — NUR
PT ASSESSED FOR PRN HHN TX. PT IS ON ROOM AIR, SPO2 92%, HR 84, RR 18. NO S/S OF RESPIRATORY 
DISTRESS. PRN NOT INDICATED AT THIS TIME. WILL CONTINUE TO MONITOR.

## 2019-11-18 NOTE — NUR
Paged Hospitalist RE: pain medication

patient reports right shoulder and back pain 8/10. Received new order for Norco 5/325mg 2 
tabs PO q6hrs PRN, read back and verified, will carry out orders and continue care.

## 2019-11-18 NOTE — NUR
Opening Shift Note

Assumed care of patient, awake and alert oriented x3.  No S/S of distress/SOB or pain noted. 
Sitter at the bed side. Bed is in lowest locked position with bedrails up x2 and call light 
is within reach of the patient. Instructed on POC and to call for assist PRN.  Educated 
patient that they well be NPO after midnight.  Patient verbalized understanding.  Running 
20ml/hr Lasix drip continuous through iv line.

## 2019-11-18 NOTE — NUR
Respiratory note:

PT ASSESSED FOR PRN MED NEB TX. PT IS CURRENTLY ON ROOM AIR: HR 84, RR 18, SPO2 94%. PT 
SHOWS NO S/S OF SOB OR RESPIRATORY DISTRESS. MED NEB TX NOT INDICATED AT THIS TIME. PT AWARE 
TO HAVE RT PAGED IF SOB OCCURS. WILL CONTINUE TO MONITOR.

## 2019-11-18 NOTE — NUR
Wound Photo Taken

of left calf. Noted minimal blood on bedding, assessed bilateral legs, noted skin tear to 
left calf. Cleansed with NS and pat dry with sterile gauze.

## 2019-11-18 NOTE — NUR
Paged [facility] Nephrology MD Whitley RE: Patient pulled Efra cath/Cancelled [facility]

Spoke with Fang from answering service. Awaiting call back. Will continue care.

## 2019-11-18 NOTE — NUR
LASIX ORDER 



DR. BURT



WANTS TO HOLD PO TORSEMIDE AND SPIROLACTONE AND CONTINUE WITH IV LASIX. TO EVALUATE PATIENTS 
DIURESIS.

## 2019-11-18 NOTE — NUR
Received call back from Angi

updated on patient status, no new orders. Per MD, no dialysis today, will consult with Dr. Asencio and patient regarding new Efra catheter placement. Will continue care.

## 2019-11-18 NOTE — NUR
Patient pulled Efra cath

received phone call patient pulled Efra cath. Entered patients room, Efra catheter on 
bedside table, dressing to left upper chest C/D/I, no bleeding noted, site asymptomatic, 
reinforced dressing. Patient reported he pulled catheter because "it was itching. Kelly Chavez CNA, reports patient pulled Quniton catheter while CXR was being taken in room. Will 
notify MD and continue care. 

-------------------------------------------------------------------------------

Addendum: 11/18/19 at 0748 by Nat Carver RN RN

-------------------------------------------------------------------------------

actual occurrence at 0530

-------------------------------------------------------------------------------

Addendum: 11/18/19 at 0750 by Nat Carver RN RN

-------------------------------------------------------------------------------

dressing to right upper chest, not left.

## 2019-11-19 VITALS — DIASTOLIC BLOOD PRESSURE: 69 MMHG | SYSTOLIC BLOOD PRESSURE: 105 MMHG

## 2019-11-19 VITALS — SYSTOLIC BLOOD PRESSURE: 116 MMHG | DIASTOLIC BLOOD PRESSURE: 63 MMHG

## 2019-11-19 VITALS — SYSTOLIC BLOOD PRESSURE: 108 MMHG | DIASTOLIC BLOOD PRESSURE: 72 MMHG

## 2019-11-19 VITALS — DIASTOLIC BLOOD PRESSURE: 66 MMHG | SYSTOLIC BLOOD PRESSURE: 107 MMHG

## 2019-11-19 VITALS — DIASTOLIC BLOOD PRESSURE: 74 MMHG | SYSTOLIC BLOOD PRESSURE: 110 MMHG

## 2019-11-19 LAB
ANION GAP SERPL CALCULATED.3IONS-SCNC: 6 MMOL/L (ref 5–15)
APTT PPP: 35.3 SEC (ref 23.64–32.05)
BUN SERPL-MCNC: 79 MG/DL (ref 7–18)
BUN/CREAT SERPL: 27
CALCIUM SERPL-MCNC: 8.2 MG/DL (ref 8.5–10.1)
CHLORIDE SERPL-SCNC: 98 MMOL/L (ref 98–107)
CO2 SERPL-SCNC: 30 MMOL/L (ref 21–32)
GLUCOSE SERPL-MCNC: 163 MG/DL (ref 74–106)
HCT VFR BLD AUTO: 33.4 % (ref 41–53)
HGB BLD-MCNC: 10.8 G/DL (ref 13.5–17.5)
INR PPP: 1.36 (ref 0.9–1.15)
MCH RBC QN AUTO: 29.2 PG (ref 28–32)
MCV RBC AUTO: 90.2 FL (ref 80–100)
NRBC BLD QL AUTO: 0.1 %
POTASSIUM SERPL-SCNC: 4.4 MMOL/L (ref 3.5–5.1)
SODIUM SERPL-SCNC: 134 MMOL/L (ref 136–145)

## 2019-11-19 RX ADMIN — PANTOPRAZOLE SODIUM SCH MG: 40 TABLET, DELAYED RELEASE ORAL at 11:35

## 2019-11-19 RX ADMIN — Medication SCH STRIP: at 06:05

## 2019-11-19 RX ADMIN — Medication SCH STRIP: at 11:38

## 2019-11-19 RX ADMIN — HUMAN INSULIN SCH UNITS: 100 INJECTION, SOLUTION SUBCUTANEOUS at 18:12

## 2019-11-19 RX ADMIN — GABAPENTIN SCH MG: 300 CAPSULE ORAL at 14:36

## 2019-11-19 RX ADMIN — GABAPENTIN SCH MG: 300 CAPSULE ORAL at 22:49

## 2019-11-19 RX ADMIN — HYDROCODONE BITARTRATE AND ACETAMINOPHEN PRN TAB: 5; 325 TABLET ORAL at 02:14

## 2019-11-19 RX ADMIN — TEMAZEPAM PRN MG: 15 CAPSULE ORAL at 22:49

## 2019-11-19 RX ADMIN — DIGOXIN SCH MG: 0.12 TABLET ORAL at 11:34

## 2019-11-19 RX ADMIN — APIXABAN SCH MG: 5 TABLET, FILM COATED ORAL at 11:34

## 2019-11-19 RX ADMIN — Medication SCH STRIP: at 17:00

## 2019-11-19 RX ADMIN — CARVEDILOL SCH MG: 3.12 TABLET, FILM COATED ORAL at 11:35

## 2019-11-19 RX ADMIN — APIXABAN SCH MG: 5 TABLET, FILM COATED ORAL at 22:49

## 2019-11-19 RX ADMIN — HUMAN INSULIN SCH UNITS: 100 INJECTION, SOLUTION SUBCUTANEOUS at 06:00

## 2019-11-19 RX ADMIN — HUMAN INSULIN SCH UNITS: 100 INJECTION, SOLUTION SUBCUTANEOUS at 12:08

## 2019-11-19 RX ADMIN — GABAPENTIN SCH MG: 300 CAPSULE ORAL at 05:06

## 2019-11-19 RX ADMIN — CARVEDILOL SCH MG: 3.12 TABLET, FILM COATED ORAL at 22:55

## 2019-11-19 RX ADMIN — ATORVASTATIN CALCIUM SCH MG: 20 TABLET, FILM COATED ORAL at 22:49

## 2019-11-19 NOTE — NUR
Opening Shift Note

Received report from day shift nurse. Patient is awake, alert and orientated x 4. No S/S of 
distress/SOB or pain. Sitter is at bedside. Patient left upper chest dressing is clean, dry, 
and intact with no drainage. Bed is in lowest position with side rails up x 2. Bed brakes 
are locked and call light is with in reach. HOB is 30 degrees. Adan catheter is in place, 
patent, draining and below bladder. Instructed on POC and to call for assist PRN, will 
continue to monitor for changes Q1hr and PRN.

## 2019-11-19 NOTE — NUR
Nutrition Follow-up Notes



Wt.: 95.5 kg as of yesterday.

Pt's on oxygen via nasal cannula, immediate family members at bedside, denies any discomfort 
except for mild SOB during rounds this morning. Pt states that he usually weighs around 180 
lbs, denies any significant weight change few months pta. Pt's diabetic, takes oral DM meds 
and insulin shots prn based on sliding scale, usually has good appetite, eat meals 
regularly, NKFA and not into any special diets pta. Pt's currently on  Renal Specific: 70 
gms pro, 2 gms Na, Consistent Standard Carb: 60 gms/meal, Cardiac: 2 gms Na, Low Chol, Low 
Fat diet with adequate PO intake aeb 80% ave. consumed meals (x6) in last 2.5 days.  Provide 
verbal and written nutrition educ. re: current therapeutic diet as well as FDI for Lasix and 
he verbalized understanding. Noted pt's NPO after midnight with active Radiologist, Wound 
and Cardiology consults. 



Est. Needs: 1700 kcal to 2250 kcal (15-20 kcal/kgBW), 56 gms to 70 gms pro (0.8-1.0 
gms/kgIBW: 70 kg). Will continue to monitor pertinent labs and reassess nutrient need prn



Labs: Gluc 153 H, Na 134 L, BUN 79 H, Cr 2.93 H, Ca 8.2 L; HbA1c 8.1 H



Skin: Jaspal scale 20, low risk, pt's left buttock skin tear per RN documentation.

 

GI: Pt had 2x BM this morning per RN documentation. 



PES: 

Altered nutrition related lab values r/t current/chronic medical condition aeb elev. renal 
labs, HbA1c, LFTs, hyperbilirubinemia, hypocalcemia 

Obesity r/t food intake more than body requirement aeb 163% IBW, BMI 28.2 kg/m2  and 
increased body adiposity



Will continue to monitor PO intake/NPO status, skin status, pertinent labs and weight trend. 
F/u in 3 to 5 days.

Rec.: 1.) Resume oral diet when medically appropriate 2.) Continue close supervision during 
meals. 3.) Consider daily MVI with minerals and Asc acid 500 mgs BID. 4.) If Albumin 
continues trending down, consider Prostat  1 pkt BID. 5.) Refer pt to CDE/RD for further 
nutrition education and weight monitoring upon discharge. 6.) Continue current plan of care.

## 2019-11-19 NOTE — NUR
assessment

Patient is a 63 year old male who is answering appropriately. Prior to admission patient 
lived home with family and functioned with assistance of his wife Red.  Patient informed 
me his PCP is Dr Mckeon. Patient informed me he has a fww for home use. Patients dialysis 
has been set up with [facility] dialysis on [address]. Patient may benefit from home 
health safety on discharge.   Per patient he will return home to his prior living 
arrangements post discharge and family will transport him home. I informed patient he has a 
right to speak to a  regarding all care. I informed patient he has a right to 
participate in any and all discharge planning.  Patient has a POA and advanced directive.  
Patient verbalized understanding and agreed to discharge plan home.

-------------------------------------------------------------------------------

Addendum: 11/19/19 at 1646 by Shaila ALFARO

-------------------------------------------------------------------------------

Amended: Links added.

## 2019-11-19 NOTE — NUR
Respiratory note:

AT BEDSIDE TO ASSESS PT FOR PRN TX. BS ARE CLEAR T/O, POX  98-97% ON RA. HR . RR 
18-20. RT NAME AND PAGER ASSIGNMENT WRITTEN ON PTS ROOM BOARD. WILL CONTINUE TO MONITOR AS 
NEEDED. CNA AT BEDSIDE.

## 2019-11-19 NOTE — NUR
Respiratory note:

ROUTINE PRN MN TX CHECK. HR 75, RR 18, POX 95% ON 2L NC, BREATH SOUNDS ARE CLEAR. NO SOB OR 
DISTRESS NOTED.PT WAS NOTIFY TO HAVE RT PAGE FOR TX.

## 2019-11-20 VITALS — DIASTOLIC BLOOD PRESSURE: 67 MMHG | SYSTOLIC BLOOD PRESSURE: 130 MMHG

## 2019-11-20 VITALS — SYSTOLIC BLOOD PRESSURE: 101 MMHG | DIASTOLIC BLOOD PRESSURE: 70 MMHG

## 2019-11-20 VITALS — DIASTOLIC BLOOD PRESSURE: 67 MMHG | SYSTOLIC BLOOD PRESSURE: 131 MMHG

## 2019-11-20 VITALS — SYSTOLIC BLOOD PRESSURE: 117 MMHG | DIASTOLIC BLOOD PRESSURE: 65 MMHG

## 2019-11-20 LAB
ALBUMIN SERPL-MCNC: 3.2 G/DL (ref 3.4–5)
ALP SERPL-CCNC: 199 U/L (ref 45–117)
ALT SERPL-CCNC: 55 U/L (ref 16–61)
ANION GAP SERPL CALCULATED.3IONS-SCNC: 8 MMOL/L (ref 5–15)
BILIRUB DIRECT SERPL-MCNC: 0.2 MG/DL (ref 0–0.2)
BILIRUB SERPL-MCNC: 0.4 MG/DL (ref 0.2–1)
BUN SERPL-MCNC: 77 MG/DL (ref 7–18)
BUN/CREAT SERPL: 30.3
CALCIUM SERPL-MCNC: 8.3 MG/DL (ref 8.5–10.1)
CHLORIDE SERPL-SCNC: 100 MMOL/L (ref 98–107)
CO2 SERPL-SCNC: 29 MMOL/L (ref 21–32)
GLUCOSE SERPL-MCNC: 119 MG/DL (ref 74–106)
POTASSIUM SERPL-SCNC: 4.2 MMOL/L (ref 3.5–5.1)
PROT SERPL-MCNC: 6.4 G/DL (ref 6.4–8.2)
SODIUM SERPL-SCNC: 137 MMOL/L (ref 136–145)

## 2019-11-20 RX ADMIN — PANTOPRAZOLE SODIUM SCH MG: 40 TABLET, DELAYED RELEASE ORAL at 09:19

## 2019-11-20 RX ADMIN — Medication SCH STRIP: at 16:55

## 2019-11-20 RX ADMIN — HUMAN INSULIN SCH UNITS: 100 INJECTION, SOLUTION SUBCUTANEOUS at 12:16

## 2019-11-20 RX ADMIN — CARVEDILOL SCH MG: 3.12 TABLET, FILM COATED ORAL at 09:19

## 2019-11-20 RX ADMIN — HUMAN INSULIN SCH UNITS: 100 INJECTION, SOLUTION SUBCUTANEOUS at 00:17

## 2019-11-20 RX ADMIN — Medication SCH STRIP: at 12:08

## 2019-11-20 RX ADMIN — HUMAN INSULIN SCH UNITS: 100 INJECTION, SOLUTION SUBCUTANEOUS at 05:57

## 2019-11-20 RX ADMIN — Medication SCH STRIP: at 05:44

## 2019-11-20 RX ADMIN — GABAPENTIN SCH MG: 300 CAPSULE ORAL at 05:44

## 2019-11-20 RX ADMIN — DIGOXIN SCH MG: 0.12 TABLET ORAL at 09:19

## 2019-11-20 RX ADMIN — HUMAN INSULIN SCH UNITS: 100 INJECTION, SOLUTION SUBCUTANEOUS at 16:55

## 2019-11-20 RX ADMIN — APIXABAN SCH MG: 5 TABLET, FILM COATED ORAL at 09:19

## 2019-11-20 RX ADMIN — HYDROCODONE BITARTRATE AND ACETAMINOPHEN PRN TAB: 5; 325 TABLET ORAL at 00:17

## 2019-11-20 RX ADMIN — GABAPENTIN SCH MG: 300 CAPSULE ORAL at 13:56

## 2019-11-20 RX ADMIN — Medication SCH STRIP: at 00:01

## 2019-11-20 NOTE — NUR
DISCHARGE INSTRUCTIONS GIVEN TO PT. VERBALIZED UNDERSTANDING. ALL APPROPRIATE PAPERWORK 
SIGNED. MEDICATIONS GOTTEN FROM [facility]. PT DISCHARGED HOME WITH FAMILY.

## 2019-11-20 NOTE — NUR
PT HAS BEEN ON ROOM AIR SINCE THIS MORNING, PULSE OX 95-97%. AMBULATED IN HALLWAYS WITH 
PHYSICAL THERAPY AND ROLLING WALKER. NO SOB NOTED

## 2019-11-20 NOTE — NUR
Discharge planning per  consult, patient has orders for home health eval, referral sent to 
[facility], acceptance pending; and [facility] for auth. If patient is 
medically cleared, they are okay to dc home. Will follow up on 11.21.19 for acceptance and 
auth.

-------------------------------------------------------------------------------

Addendum: 11/21/19 at 1625 by BEBA ARAIZA 

-------------------------------------------------------------------------------

Received a call from Xiomara at [facility] advising that they will accept this 
patient onto services and start of care will be within 24-48 hours.

## 2019-11-20 NOTE — NUR
Respiratory note:

At bedside for room air ABG. Patient on room air POX 95%. Sitter at bedside notified me pt 
has been off O2 at least 3 hours. Ambulated with physical therapy earlier today on room air 
with POX 96%. Pt says he had no complaints of SOB during ambulation, and none at rest at 
this time. Notified Dr. Sandhu, ABG order cancelled. BRANDON or[place].

## 2019-11-20 NOTE — NUR
PER DR BRODERICK, PT WILL NOT HAVE THE DIALYSIS CATH PLACED. ORDER TO BE CANCELLED. CATH LAB 
AWARE.

## 2019-11-20 NOTE — NUR
Respiratory note:

Assessed pt for prn medneb tx. HR 75, RR 16, POX 97% on 2L NC. Breath sounds clear 
throughout. No s/s of respiratory distress noted. Pt denies any SOB. Medneb tx not indicated 
at this time. Advised pt to call for RT if feeling SOB. Noc shift RN at bedside and aware as 
well.

## 2020-04-23 ENCOUNTER — HOSPITAL ENCOUNTER (INPATIENT)
Dept: HOSPITAL 15 - ER | Age: 65
LOS: 2 days | Discharge: HOME | DRG: 194 | End: 2020-04-25
Attending: INTERNAL MEDICINE | Admitting: INTERNAL MEDICINE
Payer: MEDICAID

## 2020-04-23 VITALS — SYSTOLIC BLOOD PRESSURE: 123 MMHG | DIASTOLIC BLOOD PRESSURE: 76 MMHG

## 2020-04-23 VITALS — HEIGHT: 68 IN | BODY MASS INDEX: 32.81 KG/M2 | WEIGHT: 216.49 LBS

## 2020-04-23 DIAGNOSIS — N17.0: ICD-10-CM

## 2020-04-23 DIAGNOSIS — I21.A1: ICD-10-CM

## 2020-04-23 DIAGNOSIS — Z79.01: ICD-10-CM

## 2020-04-23 DIAGNOSIS — R79.89: ICD-10-CM

## 2020-04-23 DIAGNOSIS — E11.22: ICD-10-CM

## 2020-04-23 DIAGNOSIS — Z79.899: ICD-10-CM

## 2020-04-23 DIAGNOSIS — I50.43: ICD-10-CM

## 2020-04-23 DIAGNOSIS — E11.40: ICD-10-CM

## 2020-04-23 DIAGNOSIS — J96.20: ICD-10-CM

## 2020-04-23 DIAGNOSIS — I42.0: ICD-10-CM

## 2020-04-23 DIAGNOSIS — Z79.82: ICD-10-CM

## 2020-04-23 DIAGNOSIS — N18.3: ICD-10-CM

## 2020-04-23 DIAGNOSIS — Z80.9: ICD-10-CM

## 2020-04-23 DIAGNOSIS — I25.2: ICD-10-CM

## 2020-04-23 DIAGNOSIS — F17.210: ICD-10-CM

## 2020-04-23 DIAGNOSIS — Z86.711: ICD-10-CM

## 2020-04-23 DIAGNOSIS — Z83.3: ICD-10-CM

## 2020-04-23 DIAGNOSIS — N40.0: ICD-10-CM

## 2020-04-23 DIAGNOSIS — Z95.810: ICD-10-CM

## 2020-04-23 DIAGNOSIS — I13.0: Primary | ICD-10-CM

## 2020-04-23 DIAGNOSIS — E11.21: ICD-10-CM

## 2020-04-23 DIAGNOSIS — Z79.4: ICD-10-CM

## 2020-04-23 DIAGNOSIS — I25.10: ICD-10-CM

## 2020-04-23 DIAGNOSIS — K72.00: ICD-10-CM

## 2020-04-23 DIAGNOSIS — E44.1: ICD-10-CM

## 2020-04-23 DIAGNOSIS — Z82.49: ICD-10-CM

## 2020-04-23 DIAGNOSIS — K76.1: ICD-10-CM

## 2020-04-23 DIAGNOSIS — I08.1: ICD-10-CM

## 2020-04-23 DIAGNOSIS — J44.9: ICD-10-CM

## 2020-04-23 DIAGNOSIS — Z86.718: ICD-10-CM

## 2020-04-23 DIAGNOSIS — D63.8: ICD-10-CM

## 2020-04-23 DIAGNOSIS — E66.9: ICD-10-CM

## 2020-04-23 DIAGNOSIS — I25.5: ICD-10-CM

## 2020-04-23 DIAGNOSIS — D50.9: ICD-10-CM

## 2020-04-23 LAB
ALBUMIN SERPL-MCNC: 3.3 G/DL (ref 3.4–5)
ALP SERPL-CCNC: 207 U/L (ref 45–117)
ALT SERPL-CCNC: 121 U/L (ref 16–61)
ANION GAP SERPL CALCULATED.3IONS-SCNC: 7 MMOL/L (ref 5–15)
BILIRUB SERPL-MCNC: 0.7 MG/DL (ref 0.2–1)
BUN SERPL-MCNC: 68 MG/DL (ref 7–18)
BUN/CREAT SERPL: 30
CALCIUM SERPL-MCNC: 8 MG/DL (ref 8.5–10.1)
CHLORIDE SERPL-SCNC: 106 MMOL/L (ref 98–107)
CO2 SERPL-SCNC: 26 MMOL/L (ref 21–32)
GLUCOSE SERPL-MCNC: 257 MG/DL (ref 74–106)
HCT VFR BLD AUTO: 34.6 % (ref 41–53)
HGB BLD-MCNC: 10.7 G/DL (ref 13.5–17.5)
MCH RBC QN AUTO: 27.3 PG (ref 28–32)
MCV RBC AUTO: 88.4 FL (ref 80–100)
NRBC BLD QL AUTO: 0.3 %
POTASSIUM SERPL-SCNC: 3.9 MMOL/L (ref 3.5–5.1)
PROT SERPL-MCNC: 7 G/DL (ref 6.4–8.2)
SODIUM SERPL-SCNC: 139 MMOL/L (ref 136–145)

## 2020-04-23 PROCEDURE — 80053 COMPREHEN METABOLIC PANEL: CPT

## 2020-04-23 PROCEDURE — 85025 COMPLETE CBC W/AUTO DIFF WBC: CPT

## 2020-04-23 PROCEDURE — 83880 ASSAY OF NATRIURETIC PEPTIDE: CPT

## 2020-04-23 PROCEDURE — 93306 TTE W/DOPPLER COMPLETE: CPT

## 2020-04-23 PROCEDURE — 99291 CRITICAL CARE FIRST HOUR: CPT

## 2020-04-23 PROCEDURE — 85730 THROMBOPLASTIN TIME PARTIAL: CPT

## 2020-04-23 PROCEDURE — 83036 HEMOGLOBIN GLYCOSYLATED A1C: CPT

## 2020-04-23 PROCEDURE — 85610 PROTHROMBIN TIME: CPT

## 2020-04-23 PROCEDURE — 84100 ASSAY OF PHOSPHORUS: CPT

## 2020-04-23 PROCEDURE — 93005 ELECTROCARDIOGRAM TRACING: CPT

## 2020-04-23 PROCEDURE — 82962 GLUCOSE BLOOD TEST: CPT

## 2020-04-23 PROCEDURE — 80061 LIPID PANEL: CPT

## 2020-04-23 PROCEDURE — 84484 ASSAY OF TROPONIN QUANT: CPT

## 2020-04-23 PROCEDURE — 36415 COLL VENOUS BLD VENIPUNCTURE: CPT

## 2020-04-23 PROCEDURE — 71045 X-RAY EXAM CHEST 1 VIEW: CPT

## 2020-04-23 PROCEDURE — 83540 ASSAY OF IRON: CPT

## 2020-04-23 PROCEDURE — 83550 IRON BINDING TEST: CPT

## 2020-04-23 PROCEDURE — 83735 ASSAY OF MAGNESIUM: CPT

## 2020-04-23 PROCEDURE — 81001 URINALYSIS AUTO W/SCOPE: CPT

## 2020-04-23 RX ADMIN — CARVEDILOL SCH MG: 3.12 TABLET, FILM COATED ORAL at 22:27

## 2020-04-23 RX ADMIN — FAMOTIDINE SCH MG: 20 TABLET, FILM COATED ORAL at 22:28

## 2020-04-23 RX ADMIN — APIXABAN SCH MG: 5 TABLET, FILM COATED ORAL at 22:27

## 2020-04-23 RX ADMIN — HUMAN INSULIN SCH UNITS: 100 INJECTION, SOLUTION SUBCUTANEOUS at 22:16

## 2020-04-23 RX ADMIN — Medication SCH STRIP: at 22:17

## 2020-04-24 VITALS — SYSTOLIC BLOOD PRESSURE: 125 MMHG | DIASTOLIC BLOOD PRESSURE: 83 MMHG

## 2020-04-24 VITALS — DIASTOLIC BLOOD PRESSURE: 78 MMHG | SYSTOLIC BLOOD PRESSURE: 133 MMHG

## 2020-04-24 VITALS — SYSTOLIC BLOOD PRESSURE: 143 MMHG | DIASTOLIC BLOOD PRESSURE: 86 MMHG

## 2020-04-24 VITALS — DIASTOLIC BLOOD PRESSURE: 80 MMHG | SYSTOLIC BLOOD PRESSURE: 142 MMHG

## 2020-04-24 VITALS — DIASTOLIC BLOOD PRESSURE: 74 MMHG | SYSTOLIC BLOOD PRESSURE: 134 MMHG

## 2020-04-24 LAB
ALBUMIN SERPL-MCNC: 3.3 G/DL (ref 3.4–5)
ALP SERPL-CCNC: 202 U/L (ref 45–117)
ALT SERPL-CCNC: 106 U/L (ref 16–61)
ANION GAP SERPL CALCULATED.3IONS-SCNC: 7 MMOL/L (ref 5–15)
APTT PPP: 30.4 SEC (ref 23.64–32.05)
BILIRUB SERPL-MCNC: 0.8 MG/DL (ref 0.2–1)
BUN SERPL-MCNC: 67 MG/DL (ref 7–18)
BUN/CREAT SERPL: 30
CALCIUM SERPL-MCNC: 8.1 MG/DL (ref 8.5–10.1)
CHLORIDE SERPL-SCNC: 104 MMOL/L (ref 98–107)
CHOLEST SERPL-MCNC: 112 MG/DL (ref ?–200)
CO2 SERPL-SCNC: 29 MMOL/L (ref 21–32)
GLUCOSE SERPL-MCNC: 214 MG/DL (ref 74–106)
HCT VFR BLD AUTO: 33.9 % (ref 41–53)
HDLC SERPL-MCNC: 44 MG/DL (ref 40–59)
HGB BLD-MCNC: 10.7 G/DL (ref 13.5–17.5)
INR PPP: 1.35 (ref 0.9–1.15)
IRON SERPL-MCNC: 36 UG/DL (ref 65–175)
MAGNESIUM SERPL-MCNC: 2.5 MG/DL (ref 1.6–2.6)
MCH RBC QN AUTO: 27.9 PG (ref 28–32)
MCV RBC AUTO: 88.8 FL (ref 80–100)
NRBC BLD QL AUTO: 0.3 %
POTASSIUM SERPL-SCNC: 3.8 MMOL/L (ref 3.5–5.1)
PROT SERPL-MCNC: 7.1 G/DL (ref 6.4–8.2)
SODIUM SERPL-SCNC: 140 MMOL/L (ref 136–145)
TIBC SERPL-MCNC: 338 UG/DL (ref 250–450)
TRIGL SERPL-MCNC: 70 MG/DL (ref ?–150)

## 2020-04-24 RX ADMIN — FAMOTIDINE SCH MG: 20 TABLET, FILM COATED ORAL at 23:03

## 2020-04-24 RX ADMIN — APIXABAN SCH MG: 5 TABLET, FILM COATED ORAL at 23:02

## 2020-04-24 RX ADMIN — HUMAN INSULIN SCH UNITS: 100 INJECTION, SOLUTION SUBCUTANEOUS at 17:32

## 2020-04-24 RX ADMIN — POTASSIUM CHLORIDE SCH MEQ: 1500 TABLET, EXTENDED RELEASE ORAL at 23:02

## 2020-04-24 RX ADMIN — DOBUTAMINE IN DEXTROSE SCH MLS/HR: 100 INJECTION, SOLUTION INTRAVENOUS at 18:20

## 2020-04-24 RX ADMIN — TAMSULOSIN HYDROCHLORIDE SCH MG: 0.4 CAPSULE ORAL at 09:44

## 2020-04-24 RX ADMIN — Medication SCH STRIP: at 23:03

## 2020-04-24 RX ADMIN — Medication SCH TAB: at 08:48

## 2020-04-24 RX ADMIN — Medication SCH TAB: at 17:33

## 2020-04-24 RX ADMIN — Medication SCH STRIP: at 06:56

## 2020-04-24 RX ADMIN — CARVEDILOL SCH MG: 3.12 TABLET, FILM COATED ORAL at 23:02

## 2020-04-24 RX ADMIN — Medication SCH STRIP: at 11:53

## 2020-04-24 RX ADMIN — HUMAN INSULIN SCH UNITS: 100 INJECTION, SOLUTION SUBCUTANEOUS at 23:10

## 2020-04-24 RX ADMIN — HUMAN INSULIN SCH UNITS: 100 INJECTION, SOLUTION SUBCUTANEOUS at 11:52

## 2020-04-24 RX ADMIN — DEXTROSE SCH MLS/HR: 50 INJECTION, SOLUTION INTRAVENOUS at 18:20

## 2020-04-24 RX ADMIN — APIXABAN SCH MG: 5 TABLET, FILM COATED ORAL at 09:43

## 2020-04-24 RX ADMIN — HUMAN INSULIN SCH UNITS: 100 INJECTION, SOLUTION SUBCUTANEOUS at 07:04

## 2020-04-24 RX ADMIN — PANTOPRAZOLE SODIUM SCH MG: 40 TABLET, DELAYED RELEASE ORAL at 09:43

## 2020-04-24 RX ADMIN — CARVEDILOL SCH MG: 3.12 TABLET, FILM COATED ORAL at 09:43

## 2020-04-24 RX ADMIN — Medication SCH STRIP: at 17:09

## 2020-04-25 VITALS — SYSTOLIC BLOOD PRESSURE: 111 MMHG | DIASTOLIC BLOOD PRESSURE: 62 MMHG

## 2020-04-25 VITALS — DIASTOLIC BLOOD PRESSURE: 71 MMHG | SYSTOLIC BLOOD PRESSURE: 112 MMHG

## 2020-04-25 VITALS — SYSTOLIC BLOOD PRESSURE: 124 MMHG | DIASTOLIC BLOOD PRESSURE: 58 MMHG

## 2020-04-25 VITALS — DIASTOLIC BLOOD PRESSURE: 65 MMHG | SYSTOLIC BLOOD PRESSURE: 140 MMHG

## 2020-04-25 LAB
ALBUMIN SERPL-MCNC: 3.2 G/DL (ref 3.4–5)
ALP SERPL-CCNC: 187 U/L (ref 45–117)
ALT SERPL-CCNC: 84 U/L (ref 16–61)
ANION GAP SERPL CALCULATED.3IONS-SCNC: 5 MMOL/L (ref 5–15)
BILIRUB SERPL-MCNC: 0.9 MG/DL (ref 0.2–1)
BUN SERPL-MCNC: 63 MG/DL (ref 7–18)
BUN/CREAT SERPL: 28.4
CALCIUM SERPL-MCNC: 8.2 MG/DL (ref 8.5–10.1)
CHLORIDE SERPL-SCNC: 104 MMOL/L (ref 98–107)
CO2 SERPL-SCNC: 32 MMOL/L (ref 21–32)
GLUCOSE SERPL-MCNC: 153 MG/DL (ref 74–106)
POTASSIUM SERPL-SCNC: 4.1 MMOL/L (ref 3.5–5.1)
PROT SERPL-MCNC: 6.8 G/DL (ref 6.4–8.2)
SODIUM SERPL-SCNC: 141 MMOL/L (ref 136–145)

## 2020-04-25 RX ADMIN — DEXTROSE SCH MLS/HR: 50 INJECTION, SOLUTION INTRAVENOUS at 03:42

## 2020-04-25 RX ADMIN — POTASSIUM CHLORIDE SCH MEQ: 1500 TABLET, EXTENDED RELEASE ORAL at 09:49

## 2020-04-25 RX ADMIN — HUMAN INSULIN SCH UNITS: 100 INJECTION, SOLUTION SUBCUTANEOUS at 06:42

## 2020-04-25 RX ADMIN — Medication SCH TAB: at 08:12

## 2020-04-25 RX ADMIN — TAMSULOSIN HYDROCHLORIDE SCH MG: 0.4 CAPSULE ORAL at 09:49

## 2020-04-25 RX ADMIN — DOBUTAMINE IN DEXTROSE SCH MLS/HR: 100 INJECTION, SOLUTION INTRAVENOUS at 09:16

## 2020-04-25 RX ADMIN — PANTOPRAZOLE SODIUM SCH MG: 40 TABLET, DELAYED RELEASE ORAL at 09:49

## 2020-04-25 RX ADMIN — CARVEDILOL SCH MG: 3.12 TABLET, FILM COATED ORAL at 09:49

## 2020-04-25 RX ADMIN — HUMAN INSULIN SCH UNITS: 100 INJECTION, SOLUTION SUBCUTANEOUS at 12:45

## 2020-04-25 RX ADMIN — Medication SCH STRIP: at 06:26

## 2020-04-25 RX ADMIN — APIXABAN SCH MG: 5 TABLET, FILM COATED ORAL at 09:49

## 2020-04-25 RX ADMIN — DOBUTAMINE IN DEXTROSE SCH MLS/HR: 100 INJECTION, SOLUTION INTRAVENOUS at 03:41

## 2020-04-25 RX ADMIN — Medication SCH STRIP: at 12:45

## 2020-06-02 ENCOUNTER — HOSPITAL ENCOUNTER (INPATIENT)
Dept: HOSPITAL 15 - ER | Age: 65
LOS: 4 days | DRG: 194 | End: 2020-06-06
Attending: INTERNAL MEDICINE | Admitting: NURSE PRACTITIONER
Payer: MEDICAID

## 2020-06-02 VITALS — BODY MASS INDEX: 40.43 KG/M2 | WEIGHT: 251.55 LBS | HEIGHT: 66 IN

## 2020-06-02 DIAGNOSIS — I50.23: ICD-10-CM
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
## 2020-06-02 DIAGNOSIS — D63.1: ICD-10-CM

## 2020-06-02 DIAGNOSIS — N39.0: ICD-10-CM

## 2020-06-02 DIAGNOSIS — R31.0: ICD-10-CM

## 2020-06-02 DIAGNOSIS — N17.0: ICD-10-CM

## 2020-06-02 DIAGNOSIS — T83.83XA: ICD-10-CM

## 2020-06-02 DIAGNOSIS — E11.22: ICD-10-CM

## 2020-06-02 DIAGNOSIS — J44.9: ICD-10-CM

## 2020-06-02 DIAGNOSIS — F17.200: ICD-10-CM

## 2020-06-02 DIAGNOSIS — N18.4: ICD-10-CM

## 2020-06-02 DIAGNOSIS — I13.0: Primary | ICD-10-CM

## 2020-06-02 DIAGNOSIS — Z79.899: ICD-10-CM

## 2020-06-02 DIAGNOSIS — J96.20: ICD-10-CM

## 2020-06-02 DIAGNOSIS — Z86.718: ICD-10-CM

## 2020-06-02 DIAGNOSIS — D68.59: ICD-10-CM

## 2020-06-02 DIAGNOSIS — Z95.810: ICD-10-CM

## 2020-06-02 DIAGNOSIS — E11.21: ICD-10-CM

## 2020-06-02 DIAGNOSIS — Z82.49: ICD-10-CM

## 2020-06-02 DIAGNOSIS — Z79.4: ICD-10-CM

## 2020-06-02 DIAGNOSIS — E66.9: ICD-10-CM

## 2020-06-02 PROCEDURE — 85018 HEMOGLOBIN: CPT

## 2020-06-02 PROCEDURE — 82570 ASSAY OF URINE CREATININE: CPT

## 2020-06-02 PROCEDURE — 71045 X-RAY EXAM CHEST 1 VIEW: CPT

## 2020-06-02 PROCEDURE — 84100 ASSAY OF PHOSPHORUS: CPT

## 2020-06-02 PROCEDURE — 96374 THER/PROPH/DIAG INJ IV PUSH: CPT

## 2020-06-02 PROCEDURE — 80053 COMPREHEN METABOLIC PANEL: CPT

## 2020-06-02 PROCEDURE — 87086 URINE CULTURE/COLONY COUNT: CPT

## 2020-06-02 PROCEDURE — 84156 ASSAY OF PROTEIN URINE: CPT

## 2020-06-02 PROCEDURE — 87186 SC STD MICRODIL/AGAR DIL: CPT

## 2020-06-02 PROCEDURE — 84443 ASSAY THYROID STIM HORMONE: CPT

## 2020-06-02 PROCEDURE — 87070 CULTURE OTHR SPECIMN AEROBIC: CPT

## 2020-06-02 PROCEDURE — 87804 INFLUENZA ASSAY W/OPTIC: CPT

## 2020-06-02 PROCEDURE — 85014 HEMATOCRIT: CPT

## 2020-06-02 PROCEDURE — 85025 COMPLETE CBC W/AUTO DIFF WBC: CPT

## 2020-06-02 PROCEDURE — 36415 COLL VENOUS BLD VENIPUNCTURE: CPT

## 2020-06-02 PROCEDURE — 82306 VITAMIN D 25 HYDROXY: CPT

## 2020-06-02 PROCEDURE — 74176 CT ABD & PELVIS W/O CONTRAST: CPT

## 2020-06-02 PROCEDURE — 82962 GLUCOSE BLOOD TEST: CPT

## 2020-06-02 PROCEDURE — 83970 ASSAY OF PARATHORMONE: CPT

## 2020-06-02 PROCEDURE — 84300 ASSAY OF URINE SODIUM: CPT

## 2020-06-02 PROCEDURE — 80048 BASIC METABOLIC PNL TOTAL CA: CPT

## 2020-06-02 PROCEDURE — 87040 BLOOD CULTURE FOR BACTERIA: CPT

## 2020-06-02 PROCEDURE — 83735 ASSAY OF MAGNESIUM: CPT

## 2020-06-02 PROCEDURE — 87880 STREP A ASSAY W/OPTIC: CPT

## 2020-06-02 PROCEDURE — 92950 HEART/LUNG RESUSCITATION CPR: CPT

## 2020-06-02 PROCEDURE — 94002 VENT MGMT INPAT INIT DAY: CPT

## 2020-06-02 PROCEDURE — 82728 ASSAY OF FERRITIN: CPT

## 2020-06-02 PROCEDURE — 81001 URINALYSIS AUTO W/SCOPE: CPT

## 2020-06-02 PROCEDURE — 84550 ASSAY OF BLOOD/URIC ACID: CPT

## 2020-06-02 PROCEDURE — 87088 URINE BACTERIA CULTURE: CPT

## 2020-06-02 PROCEDURE — 83880 ASSAY OF NATRIURETIC PEPTIDE: CPT

## 2020-06-02 PROCEDURE — 84484 ASSAY OF TROPONIN QUANT: CPT

## 2020-06-02 PROCEDURE — 93005 ELECTROCARDIOGRAM TRACING: CPT

## 2020-06-03 VITALS — DIASTOLIC BLOOD PRESSURE: 54 MMHG | SYSTOLIC BLOOD PRESSURE: 95 MMHG

## 2020-06-03 VITALS — DIASTOLIC BLOOD PRESSURE: 69 MMHG | SYSTOLIC BLOOD PRESSURE: 107 MMHG

## 2020-06-03 VITALS — SYSTOLIC BLOOD PRESSURE: 105 MMHG | DIASTOLIC BLOOD PRESSURE: 60 MMHG

## 2020-06-03 LAB
ALBUMIN SERPL-MCNC: 3.1 G/DL (ref 3.4–5)
ALP SERPL-CCNC: 179 U/L (ref 45–117)
ALT SERPL-CCNC: 31 U/L (ref 16–61)
ANION GAP SERPL CALCULATED.3IONS-SCNC: 5 MMOL/L (ref 5–15)
BILIRUB SERPL-MCNC: 0.6 MG/DL (ref 0.2–1)
BUN SERPL-MCNC: 63 MG/DL (ref 7–18)
BUN/CREAT SERPL: 26.9
CALCIUM SERPL-MCNC: 7.9 MG/DL (ref 8.5–10.1)
CHLORIDE SERPL-SCNC: 108 MMOL/L (ref 98–107)
CO2 SERPL-SCNC: 28 MMOL/L (ref 21–32)
GLUCOSE SERPL-MCNC: 216 MG/DL (ref 74–106)
HCT VFR BLD AUTO: 32.9 % (ref 41–53)
HGB BLD-MCNC: 10 G/DL (ref 13.5–17.5)
MAGNESIUM SERPL-MCNC: 2.4 MG/DL (ref 1.6–2.6)
MCH RBC QN AUTO: 27.9 PG (ref 28–32)
MCV RBC AUTO: 91.1 FL (ref 80–100)
NRBC BLD QL AUTO: 0.1 %
POTASSIUM SERPL-SCNC: 4.5 MMOL/L (ref 3.5–5.1)
PROT SERPL-MCNC: 7.3 G/DL (ref 6.4–8.2)
SODIUM SERPL-SCNC: 141 MMOL/L (ref 136–145)
WBC CLUMPS UR QL AUTO: PRESENT /HPF

## 2020-06-03 RX ADMIN — HUMAN INSULIN SCH UNITS: 100 INJECTION, SOLUTION SUBCUTANEOUS at 06:32

## 2020-06-03 RX ADMIN — ATORVASTATIN CALCIUM SCH MG: 20 TABLET, FILM COATED ORAL at 21:30

## 2020-06-03 RX ADMIN — CARVEDILOL SCH MG: 3.12 TABLET, FILM COATED ORAL at 21:30

## 2020-06-03 RX ADMIN — HUMAN INSULIN SCH UNITS: 100 INJECTION, SOLUTION SUBCUTANEOUS at 12:15

## 2020-06-03 RX ADMIN — FUROSEMIDE SCH MG: 10 INJECTION, SOLUTION INTRAMUSCULAR; INTRAVENOUS at 17:53

## 2020-06-03 RX ADMIN — TAMSULOSIN HYDROCHLORIDE SCH MG: 0.4 CAPSULE ORAL at 17:53

## 2020-06-03 RX ADMIN — APIXABAN SCH MG: 5 TABLET, FILM COATED ORAL at 21:30

## 2020-06-03 RX ADMIN — PANTOPRAZOLE SODIUM SCH MG: 40 TABLET, DELAYED RELEASE ORAL at 10:07

## 2020-06-03 RX ADMIN — Medication SCH STRIP: at 23:56

## 2020-06-03 RX ADMIN — HUMAN INSULIN SCH UNITS: 100 INJECTION, SOLUTION SUBCUTANEOUS at 17:58

## 2020-06-03 RX ADMIN — Medication SCH STRIP: at 06:25

## 2020-06-03 RX ADMIN — Medication SCH STRIP: at 12:15

## 2020-06-03 RX ADMIN — APIXABAN SCH MG: 5 TABLET, FILM COATED ORAL at 10:07

## 2020-06-03 RX ADMIN — HUMAN INSULIN SCH UNITS: 100 INJECTION, SOLUTION SUBCUTANEOUS at 23:56

## 2020-06-03 RX ADMIN — Medication SCH STRIP: at 17:58

## 2020-06-04 VITALS — DIASTOLIC BLOOD PRESSURE: 69 MMHG | SYSTOLIC BLOOD PRESSURE: 128 MMHG

## 2020-06-04 VITALS — DIASTOLIC BLOOD PRESSURE: 66 MMHG | SYSTOLIC BLOOD PRESSURE: 109 MMHG

## 2020-06-04 VITALS — SYSTOLIC BLOOD PRESSURE: 47 MMHG | DIASTOLIC BLOOD PRESSURE: 46 MMHG

## 2020-06-04 VITALS — DIASTOLIC BLOOD PRESSURE: 63 MMHG | SYSTOLIC BLOOD PRESSURE: 121 MMHG

## 2020-06-04 VITALS — SYSTOLIC BLOOD PRESSURE: 87 MMHG | DIASTOLIC BLOOD PRESSURE: 46 MMHG

## 2020-06-04 LAB
ANION GAP SERPL CALCULATED.3IONS-SCNC: 3 MMOL/L (ref 5–15)
BUN SERPL-MCNC: 70 MG/DL (ref 7–18)
BUN/CREAT SERPL: 26.6
CALCIUM SERPL-MCNC: 8 MG/DL (ref 8.5–10.1)
CHLORIDE SERPL-SCNC: 110 MMOL/L (ref 98–107)
CO2 SERPL-SCNC: 27 MMOL/L (ref 21–32)
GLUCOSE SERPL-MCNC: 189 MG/DL (ref 74–106)
HCT VFR BLD AUTO: 32 % (ref 41–53)
HGB BLD-MCNC: 10 G/DL (ref 13.5–17.5)
MAGNESIUM SERPL-MCNC: 2.9 MG/DL (ref 1.6–2.6)
MCH RBC QN AUTO: 28.8 PG (ref 28–32)
MCV RBC AUTO: 92.2 FL (ref 80–100)
NRBC BLD QL AUTO: 0.2 %
POTASSIUM SERPL-SCNC: 5 MMOL/L (ref 3.5–5.1)
SODIUM SERPL-SCNC: 140 MMOL/L (ref 136–145)
URATE SERPL-MCNC: 5.1 MG/DL (ref 3.5–7.2)

## 2020-06-04 RX ADMIN — HYDROCODONE BITARTRATE AND ACETAMINOPHEN PRN TAB: 5; 325 TABLET ORAL at 06:41

## 2020-06-04 RX ADMIN — SODIUM CHLORIDE SCH MG: 9 INJECTION, SOLUTION INTRAVENOUS at 18:12

## 2020-06-04 RX ADMIN — TEMAZEPAM PRN MG: 15 CAPSULE ORAL at 21:25

## 2020-06-04 RX ADMIN — HUMAN INSULIN SCH UNITS: 100 INJECTION, SOLUTION SUBCUTANEOUS at 14:54

## 2020-06-04 RX ADMIN — CALCIUM ACETATE SCH MG: 667 CAPSULE ORAL at 18:13

## 2020-06-04 RX ADMIN — Medication SCH STRIP: at 14:49

## 2020-06-04 RX ADMIN — HUMAN INSULIN SCH UNITS: 100 INJECTION, SOLUTION SUBCUTANEOUS at 23:32

## 2020-06-04 RX ADMIN — Medication SCH STRIP: at 05:14

## 2020-06-04 RX ADMIN — Medication SCH STRIP: at 18:13

## 2020-06-04 RX ADMIN — Medication SCH STRIP: at 23:32

## 2020-06-04 RX ADMIN — APIXABAN SCH MG: 5 TABLET, FILM COATED ORAL at 10:00

## 2020-06-04 RX ADMIN — CARVEDILOL SCH MG: 3.12 TABLET, FILM COATED ORAL at 21:25

## 2020-06-04 RX ADMIN — APIXABAN SCH MG: 5 TABLET, FILM COATED ORAL at 21:25

## 2020-06-04 RX ADMIN — PANTOPRAZOLE SODIUM SCH MG: 40 TABLET, DELAYED RELEASE ORAL at 10:31

## 2020-06-04 RX ADMIN — CARVEDILOL SCH MG: 3.12 TABLET, FILM COATED ORAL at 10:00

## 2020-06-04 RX ADMIN — HYDROCODONE BITARTRATE AND ACETAMINOPHEN PRN TAB: 5; 325 TABLET ORAL at 23:36

## 2020-06-04 RX ADMIN — HUMAN INSULIN SCH UNITS: 100 INJECTION, SOLUTION SUBCUTANEOUS at 18:14

## 2020-06-04 RX ADMIN — FUROSEMIDE SCH MG: 10 INJECTION, SOLUTION INTRAMUSCULAR; INTRAVENOUS at 05:30

## 2020-06-04 RX ADMIN — TAMSULOSIN HYDROCHLORIDE SCH MG: 0.4 CAPSULE ORAL at 18:13

## 2020-06-04 RX ADMIN — HUMAN INSULIN SCH UNITS: 100 INJECTION, SOLUTION SUBCUTANEOUS at 05:13

## 2020-06-04 RX ADMIN — ATORVASTATIN CALCIUM SCH MG: 20 TABLET, FILM COATED ORAL at 21:25

## 2020-06-05 VITALS — SYSTOLIC BLOOD PRESSURE: 129 MMHG | DIASTOLIC BLOOD PRESSURE: 74 MMHG

## 2020-06-05 VITALS — SYSTOLIC BLOOD PRESSURE: 112 MMHG | DIASTOLIC BLOOD PRESSURE: 83 MMHG

## 2020-06-05 VITALS — SYSTOLIC BLOOD PRESSURE: 128 MMHG | DIASTOLIC BLOOD PRESSURE: 83 MMHG

## 2020-06-05 VITALS — DIASTOLIC BLOOD PRESSURE: 74 MMHG | SYSTOLIC BLOOD PRESSURE: 132 MMHG

## 2020-06-05 VITALS — DIASTOLIC BLOOD PRESSURE: 73 MMHG | SYSTOLIC BLOOD PRESSURE: 123 MMHG

## 2020-06-05 VITALS — DIASTOLIC BLOOD PRESSURE: 70 MMHG | SYSTOLIC BLOOD PRESSURE: 128 MMHG

## 2020-06-05 VITALS — SYSTOLIC BLOOD PRESSURE: 139 MMHG | DIASTOLIC BLOOD PRESSURE: 77 MMHG

## 2020-06-05 LAB
ANION GAP SERPL CALCULATED.3IONS-SCNC: 4 MMOL/L (ref 5–15)
BUN SERPL-MCNC: 79 MG/DL (ref 7–18)
BUN/CREAT SERPL: 28.3
CALCIUM SERPL-MCNC: 8.6 MG/DL (ref 8.5–10.1)
CHLORIDE SERPL-SCNC: 106 MMOL/L (ref 98–107)
CO2 SERPL-SCNC: 29 MMOL/L (ref 21–32)
GLUCOSE SERPL-MCNC: 216 MG/DL (ref 74–106)
HCT VFR BLD AUTO: 32.2 % (ref 41–53)
HCT VFR BLD AUTO: 32.3 % (ref 41–53)
HGB BLD-MCNC: 10.1 G/DL (ref 13.5–17.5)
HGB BLD-MCNC: 10.1 G/DL (ref 13.5–17.5)
POTASSIUM SERPL-SCNC: 4.6 MMOL/L (ref 3.5–5.1)
PROT UR-MCNC: 496.8 MG/DL (ref 0–11.9)
SODIUM SERPL-SCNC: 139 MMOL/L (ref 136–145)

## 2020-06-05 RX ADMIN — HUMAN INSULIN SCH UNITS: 100 INJECTION, SOLUTION SUBCUTANEOUS at 12:19

## 2020-06-05 RX ADMIN — HUMAN INSULIN SCH UNITS: 100 INJECTION, SOLUTION SUBCUTANEOUS at 05:35

## 2020-06-05 RX ADMIN — TEMAZEPAM PRN MG: 15 CAPSULE ORAL at 22:27

## 2020-06-05 RX ADMIN — HYDROCODONE BITARTRATE AND ACETAMINOPHEN PRN TAB: 5; 325 TABLET ORAL at 17:16

## 2020-06-05 RX ADMIN — ATORVASTATIN CALCIUM SCH MG: 20 TABLET, FILM COATED ORAL at 22:28

## 2020-06-05 RX ADMIN — TAMSULOSIN HYDROCHLORIDE SCH MG: 0.4 CAPSULE ORAL at 19:46

## 2020-06-05 RX ADMIN — CALCIUM ACETATE SCH MG: 667 CAPSULE ORAL at 19:46

## 2020-06-05 RX ADMIN — HYDROCODONE BITARTRATE AND ACETAMINOPHEN PRN TAB: 5; 325 TABLET ORAL at 22:30

## 2020-06-05 RX ADMIN — PANTOPRAZOLE SODIUM SCH MG: 40 TABLET, DELAYED RELEASE ORAL at 10:22

## 2020-06-05 RX ADMIN — HUMAN INSULIN SCH UNITS: 100 INJECTION, SOLUTION SUBCUTANEOUS at 17:11

## 2020-06-05 RX ADMIN — SODIUM CHLORIDE SCH MG: 9 INJECTION, SOLUTION INTRAVENOUS at 18:00

## 2020-06-05 RX ADMIN — Medication SCH STRIP: at 12:18

## 2020-06-05 RX ADMIN — Medication SCH STRIP: at 17:10

## 2020-06-05 RX ADMIN — SODIUM CHLORIDE SCH MG: 9 INJECTION, SOLUTION INTRAVENOUS at 05:34

## 2020-06-05 RX ADMIN — CARVEDILOL SCH MG: 3.12 TABLET, FILM COATED ORAL at 22:28

## 2020-06-05 RX ADMIN — CALCIUM ACETATE SCH MG: 667 CAPSULE ORAL at 12:00

## 2020-06-05 RX ADMIN — CARVEDILOL SCH MG: 3.12 TABLET, FILM COATED ORAL at 10:22

## 2020-06-05 RX ADMIN — APIXABAN SCH MG: 5 TABLET, FILM COATED ORAL at 10:23

## 2020-06-05 RX ADMIN — Medication SCH STRIP: at 05:35

## 2020-06-05 RX ADMIN — CALCIUM ACETATE SCH MG: 667 CAPSULE ORAL at 08:10

## 2020-06-06 VITALS — DIASTOLIC BLOOD PRESSURE: 33 MMHG | SYSTOLIC BLOOD PRESSURE: 63 MMHG

## 2020-06-06 VITALS — DIASTOLIC BLOOD PRESSURE: 73 MMHG | SYSTOLIC BLOOD PRESSURE: 130 MMHG

## 2020-06-06 VITALS — SYSTOLIC BLOOD PRESSURE: 105 MMHG | DIASTOLIC BLOOD PRESSURE: 62 MMHG

## 2020-06-06 VITALS — DIASTOLIC BLOOD PRESSURE: 52 MMHG | SYSTOLIC BLOOD PRESSURE: 85 MMHG

## 2020-06-06 PROCEDURE — 02HV33Z INSERTION OF INFUSION DEVICE INTO SUPERIOR VENA CAVA, PERCUTANEOUS APPROACH: ICD-10-PCS

## 2020-06-06 PROCEDURE — 0BH17EZ INSERTION OF ENDOTRACHEAL AIRWAY INTO TRACHEA, VIA NATURAL OR ARTIFICIAL OPENING: ICD-10-PCS | Performed by: HOSPITALIST

## 2020-06-06 RX ADMIN — HUMAN INSULIN SCH UNITS: 100 INJECTION, SOLUTION SUBCUTANEOUS at 00:00

## 2020-06-06 RX ADMIN — Medication SCH STRIP: at 00:00

## 2024-01-29 NOTE — NUR
Lily at bedside

To insert Efra catheter. (2) Partial paralysis (total or near-total paralysis of lower face)